# Patient Record
Sex: FEMALE | Race: WHITE | NOT HISPANIC OR LATINO | Employment: OTHER | URBAN - NONMETROPOLITAN AREA
[De-identification: names, ages, dates, MRNs, and addresses within clinical notes are randomized per-mention and may not be internally consistent; named-entity substitution may affect disease eponyms.]

---

## 2017-05-30 ENCOUNTER — OFFICE VISIT (OUTPATIENT)
Dept: OBSTETRICS AND GYNECOLOGY | Facility: CLINIC | Age: 67
End: 2017-05-30

## 2017-05-30 VITALS
BODY MASS INDEX: 33.77 KG/M2 | WEIGHT: 172 LBS | DIASTOLIC BLOOD PRESSURE: 76 MMHG | HEIGHT: 60 IN | SYSTOLIC BLOOD PRESSURE: 120 MMHG

## 2017-05-30 DIAGNOSIS — N81.6 RECTOCELE: ICD-10-CM

## 2017-05-30 DIAGNOSIS — N90.89 VULVAR LESION: ICD-10-CM

## 2017-05-30 DIAGNOSIS — N81.4 UTERINE PROLAPS: ICD-10-CM

## 2017-05-30 DIAGNOSIS — Z01.419 WELL WOMAN EXAM WITH ROUTINE GYNECOLOGICAL EXAM: Primary | ICD-10-CM

## 2017-05-30 DIAGNOSIS — Z12.31 ENCOUNTER FOR SCREENING MAMMOGRAM FOR MALIGNANT NEOPLASM OF BREAST: ICD-10-CM

## 2017-05-30 PROCEDURE — G0101 CA SCREEN;PELVIC/BREAST EXAM: HCPCS | Performed by: NURSE PRACTITIONER

## 2017-05-30 RX ORDER — ATORVASTATIN CALCIUM 20 MG/1
TABLET, FILM COATED ORAL
COMMUNITY

## 2017-05-30 RX ORDER — PHENOL 1.4 %
AEROSOL, SPRAY (ML) MUCOUS MEMBRANE
COMMUNITY
End: 2018-06-14

## 2017-05-30 RX ORDER — LOSARTAN POTASSIUM AND HYDROCHLOROTHIAZIDE 12.5; 5 MG/1; MG/1
TABLET ORAL
COMMUNITY

## 2017-05-30 RX ORDER — OMEPRAZOLE 20 MG/1
CAPSULE, DELAYED RELEASE ORAL
COMMUNITY

## 2017-05-30 RX ORDER — ACETAMINOPHEN 500 MG
TABLET ORAL EVERY 6 HOURS
COMMUNITY

## 2017-05-30 RX ORDER — VALGANCICLOVIR 450 MG/1
TABLET, FILM COATED ORAL
COMMUNITY
End: 2018-06-14 | Stop reason: DRUGHIGH

## 2017-06-14 ENCOUNTER — HOSPITAL ENCOUNTER (OUTPATIENT)
Dept: MAMMOGRAPHY | Facility: HOSPITAL | Age: 67
Discharge: HOME | End: 2017-06-14
Admitting: NURSE PRACTITIONER

## 2017-06-14 DIAGNOSIS — Z12.31 ENCOUNTER FOR SCREENING MAMMOGRAM FOR MALIGNANT NEOPLASM OF BREAST: ICD-10-CM

## 2017-06-14 PROCEDURE — 77063 BREAST TOMOSYNTHESIS BI: CPT

## 2017-06-14 PROCEDURE — G0202 SCR MAMMO BI INCL CAD: HCPCS

## 2018-06-14 ENCOUNTER — OFFICE VISIT (OUTPATIENT)
Dept: OBSTETRICS AND GYNECOLOGY | Facility: CLINIC | Age: 68
End: 2018-06-14

## 2018-06-14 VITALS
DIASTOLIC BLOOD PRESSURE: 70 MMHG | WEIGHT: 171 LBS | SYSTOLIC BLOOD PRESSURE: 120 MMHG | BODY MASS INDEX: 33.57 KG/M2 | HEIGHT: 60 IN

## 2018-06-14 DIAGNOSIS — N95.2 VAGINAL ATROPHY: ICD-10-CM

## 2018-06-14 DIAGNOSIS — Z12.31 ENCOUNTER FOR SCREENING MAMMOGRAM FOR MALIGNANT NEOPLASM OF BREAST: Primary | ICD-10-CM

## 2018-06-14 PROCEDURE — G0123 SCREEN CERV/VAG THIN LAYER: HCPCS | Performed by: NURSE PRACTITIONER

## 2018-06-14 PROCEDURE — 99213 OFFICE O/P EST LOW 20 MIN: CPT | Performed by: NURSE PRACTITIONER

## 2018-06-14 RX ORDER — VALACYCLOVIR HYDROCHLORIDE 500 MG/1
500 TABLET, FILM COATED ORAL
Status: ON HOLD | COMMUNITY
End: 2018-12-03

## 2018-06-14 RX ORDER — PREGABALIN 75 MG/1
75 CAPSULE ORAL 2 TIMES DAILY
Status: ON HOLD | COMMUNITY
End: 2018-12-03

## 2018-06-14 NOTE — PROGRESS NOTES
"      Mally Corea is a 68 y.o.      Chief Complaint   Patient presents with   • Gynecologic Exam     I am here for a yearly exam with a pap.  No problems           HPI Mally is in today, she c/o vaginal dryness. She is not taking HRT. She is not taking HRT.  She occ. has some urinary urgency.  She is going to continue exercising.        The following portions of the patient's history were reviewed and updated as appropriate:vital signs, allergies, current medications, past family history, past medical history, past social history, past surgical history and problem list.      Current Outpatient Prescriptions:   •  acetaminophen (TYLENOL) 500 MG tablet, Take  by mouth Every 6 (Six) Hours., Disp: , Rfl:   •  atorvastatin (LIPITOR) 20 MG tablet, Take  by mouth., Disp: , Rfl:   •  losartan-hydrochlorothiazide (HYZAAR) 50-12.5 MG per tablet, Take  by mouth., Disp: , Rfl:   •  Multiple Vitamins-Minerals (PRESERVISION AREDS 2+MULTI VIT PO), Take  by mouth., Disp: , Rfl:   •  omeprazole (priLOSEC) 20 MG capsule, Take  by mouth., Disp: , Rfl:   •  pregabalin (LYRICA) 75 MG capsule, Take 75 mg by mouth 2 (Two) Times a Day., Disp: , Rfl:   •  valACYclovir (VALTREX) 500 MG tablet, Take 500 mg by mouth., Disp: , Rfl:     Review of Systems  Breast ROS: negative    Objective      /70 (BP Location: Left arm, Patient Position: Sitting, Cuff Size: Adult)   Ht 152.4 cm (60\")   Wt 77.6 kg (171 lb)   BMI 33.40 kg/m²       Physical Exam   Constitutional: She appears well-developed and well-nourished.   HENT:   Head: Normocephalic and atraumatic.   Eyes: Right eye exhibits no discharge. Left eye exhibits no discharge.   Pulmonary/Chest: Effort normal.   Abdominal: Soft. She exhibits no distension.   Genitourinary: There is no rash or tenderness on the right labia. There is no tenderness or lesion on the left labia. Right adnexum displays no mass, no tenderness and no fullness. Left adnexum displays no mass, no " tenderness and no fullness. No erythema, tenderness or bleeding in the vagina. No foreign body in the vagina. No signs of injury around the vagina. No vaginal discharge found.   Genitourinary Comments: Vaginal tissues are atrophic.   Skin: No erythema. No pallor.   Psychiatric: She has a normal mood and affect.   Nursing note and vitals reviewed.       Assessment/Plan     Diagnoses and all orders for this visit:    Encounter for screening mammogram for malignant neoplasm of breast  -     Mammo Screening Digital Tomosynthesis Bilateral With CAD; Future  -     Liquid-based Pap Smear, Screening    Vaginal atrophy    Use bisi nut oil    BMI elevated  The patient is asked to make an attempt to improve diet and exercise patterns to aid in medical management of this problem.              Merlyn Sanabria, APRN  6/14/2018

## 2018-06-18 LAB
GEN CATEG CVX/VAG CYTO-IMP: NORMAL
LAB AP CASE REPORT: NORMAL
LAB AP GYN ADDITIONAL INFORMATION: NORMAL
LAB AP GYN OTHER FINDINGS: NORMAL
PATH INTERP SPEC-IMP: NORMAL
STAT OF ADQ CVX/VAG CYTO-IMP: NORMAL

## 2018-06-19 ENCOUNTER — HOSPITAL ENCOUNTER (OUTPATIENT)
Dept: MAMMOGRAPHY | Facility: HOSPITAL | Age: 68
Discharge: HOME | End: 2018-06-19
Admitting: NURSE PRACTITIONER

## 2018-06-19 DIAGNOSIS — Z12.31 ENCOUNTER FOR SCREENING MAMMOGRAM FOR MALIGNANT NEOPLASM OF BREAST: ICD-10-CM

## 2018-06-19 PROCEDURE — 77063 BREAST TOMOSYNTHESIS BI: CPT

## 2018-06-19 PROCEDURE — 77067 SCR MAMMO BI INCL CAD: CPT

## 2018-10-16 ENCOUNTER — OFFICE VISIT (OUTPATIENT)
Dept: GASTROENTEROLOGY | Facility: CLINIC | Age: 68
End: 2018-10-16

## 2018-10-16 VITALS
BODY MASS INDEX: 34.95 KG/M2 | WEIGHT: 178 LBS | OXYGEN SATURATION: 98 % | DIASTOLIC BLOOD PRESSURE: 70 MMHG | HEART RATE: 73 BPM | TEMPERATURE: 96.8 F | SYSTOLIC BLOOD PRESSURE: 132 MMHG | HEIGHT: 60 IN

## 2018-10-16 DIAGNOSIS — Z86.010 HX OF COLONIC POLYPS: Primary | ICD-10-CM

## 2018-10-16 PROBLEM — Z86.0100 HX OF COLONIC POLYPS: Status: ACTIVE | Noted: 2018-10-16

## 2018-10-16 PROCEDURE — S0260 H&P FOR SURGERY: HCPCS | Performed by: NURSE PRACTITIONER

## 2018-10-16 RX ORDER — SODIUM, POTASSIUM,MAG SULFATES 17.5-3.13G
2 SOLUTION, RECONSTITUTED, ORAL ORAL ONCE
Qty: 2 BOTTLE | Refills: 0 | Status: SHIPPED | OUTPATIENT
Start: 2018-10-16 | End: 2018-10-16

## 2018-10-16 NOTE — PROGRESS NOTES
Chief Complaint   Patient presents with   • Colon Cancer Screening     Patient is here today for colon screening.     Subjective   HPI  Mally Corea is a 68 y.o. female who presents as a referral for preventative maintenance. She has no complaints of nausea or vomiting. No change in bowels. No wt loss. No BRBPR. No melena. The patient is adopted and does not know family history.  No abdominal pain. There was no Cologaurd screening this year. The patients last colonoscopy 11/14/13 with finding of tubular adenoma.   Past Medical History:   Diagnosis Date   • Basal cell carcinoma     Left calf, nose, left forearm, right side of chest   • Colon polyp    • GERD (gastroesophageal reflux disease)    • Hyperlipidemia    • Hypertension    • Schatzki's ring    • Skin cancer, basal cell      Past Surgical History:   Procedure Laterality Date   • ADENOIDECTOMY     • BASAL CELL CARCINOMA EXCISION     • BLEPHAROPLASTY     • CATARACT EXTRACTION Bilateral    • COLONOSCOPY  11/14/2013   • REDUCTION MAMMAPLASTY Bilateral 2015   • REPLACEMENT TOTAL KNEE Left    • RETINAL DETACHMENT REPAIR Bilateral    • TONSILLECTOMY     • TUBAL ABDOMINAL LIGATION     • UPPER GASTROINTESTINAL ENDOSCOPY  12/16/2009       Current Outpatient Prescriptions:   •  acetaminophen (TYLENOL) 500 MG tablet, Take  by mouth Every 6 (Six) Hours., Disp: , Rfl:   •  atorvastatin (LIPITOR) 20 MG tablet, Take  by mouth., Disp: , Rfl:   •  losartan-hydrochlorothiazide (HYZAAR) 50-12.5 MG per tablet, Take  by mouth., Disp: , Rfl:   •  Multiple Vitamins-Minerals (PRESERVISION AREDS 2+MULTI VIT PO), Take  by mouth., Disp: , Rfl:   •  omeprazole (priLOSEC) 20 MG capsule, Take  by mouth., Disp: , Rfl:   •  pregabalin (LYRICA) 75 MG capsule, Take 75 mg by mouth 2 (Two) Times a Day., Disp: , Rfl:   •  valACYclovir (VALTREX) 500 MG tablet, Take 500 mg by mouth., Disp: , Rfl:   •  sodium-potassium-magnesium sulfates (SUPREP BOWEL PREP KIT) 17.5-3.13-1.6 GM/177ML solution  "oral solution, Take 2 bottles by mouth 1 (One) Time for 1 dose. Split dose prep as directed by office instructions provided.  2 bottles = one kit., Disp: 2 bottle, Rfl: 0  Allergies   Allergen Reactions   • Nsaids Itching and Swelling   • Sulfa Antibiotics Nausea And Vomiting   • Ace Inhibitors Cough     Social History     Social History   • Marital status:      Spouse name: N/A   • Number of children: N/A   • Years of education: N/A     Occupational History   • Not on file.     Social History Main Topics   • Smoking status: Never Smoker   • Smokeless tobacco: Never Used   • Alcohol use Yes      Comment: socially   • Drug use: No   • Sexual activity: No     Other Topics Concern   • Not on file     Social History Narrative   • No narrative on file     Family History   Problem Relation Age of Onset   • Adopted: Yes       REVIEW OF SYSTEMS  General: well appearing, no fever chills or sweats, no unexplained wt loss  HEENT: no acute visual or hearing disturbances  Cardiovascular: No chest pain or palpitations  Pulmonary: No shortness of breath, coughing, wheezing or hemoptysis  : No burning, urgency, hematuria, or dysuria  Musculoskeletal: No joint pain or stiffness  Peripheral: no edema  Skin: No lesions or rashes  Neuro: No dizziness, headaches, stroke, syncope  Endocrine: No hot or cold intolerances  Hematological: No blood dyscrasias    Objective   Vitals:    10/16/18 1326   BP: 132/70   Pulse: 73   Temp: 96.8 °F (36 °C)   SpO2: 98%   Weight: 80.7 kg (178 lb)   Height: 152.4 cm (60\")     Body mass index is 34.76 kg/m².    PHYSICAL EXAM  General: age appropriate well nourished well appearing, no acute distress  Head: normocephalic and atraumatic  Global assessment-supple  Neck-No JVD noted, no lymphadenopathy  Pulmonary-clear to auscultation bilaterally, normal respiratory effort  Cardiovascular-normal rate and rhythm, normal heart sounds, S1 and S2 noted  Abdomen-soft, non tender, non distended, normal bowel " sounds all 4 quadrants, no hepatosplenomegaly noted  Extremities-No clubbing cyanosis or edema  Neuro-Non focal, converses appropriately, awake, alert, oriented    Imaging Results (most recent)     None        Assessment/Plan   Mally was seen today for colon cancer screening.    Diagnoses and all orders for this visit:    Hx of colonic polyps  -     Case Request; Standing  -     Case Request    Other orders  -     Follow Anesthesia Guidelines / Standing Orders; Future  -     Implement Anesthesia Orders Day of Procedure; Standing  -     Obtain Informed Consent; Standing  -     Verify bowel prep was successful; Standing  -     sodium-potassium-magnesium sulfates (SUPREP BOWEL PREP KIT) 17.5-3.13-1.6 GM/177ML solution oral solution; Take 2 bottles by mouth 1 (One) Time for 1 dose. Split dose prep as directed by office instructions provided.  2 bottles = one kit.      COLONOSCOPY WITH ANESTHESIA (N/A)       Body mass index is 34.76 kg/m². Patient's Body mass index is 34.76 kg/m². BMI is above normal parameters. Recommendations include: no follow-up required.      All risks, benefits, alternatives, and indications of colonoscopy procedure have been discussed with the patient. Risks to include perforation of the colon requiring possible surgery or colostomy, risk of bleeding from biopsies or removal of colon tissue, possibility of missing a colon polyp or cancer, or adverse drug reaction.  Benefits to include the diagnosis and management of disease of the colon and rectum. Alternatives to include barium enema, radiographic evaluation, lab testing or no intervention. Pt verbalizes understanding and agrees.

## 2018-12-03 ENCOUNTER — HOSPITAL ENCOUNTER (OUTPATIENT)
Facility: HOSPITAL | Age: 68
Setting detail: HOSPITAL OUTPATIENT SURGERY
Discharge: HOME | End: 2018-12-03
Attending: INTERNAL MEDICINE | Admitting: INTERNAL MEDICINE

## 2018-12-03 ENCOUNTER — ANESTHESIA EVENT (OUTPATIENT)
Dept: GASTROENTEROLOGY | Facility: HOSPITAL | Age: 68
End: 2018-12-03

## 2018-12-03 ENCOUNTER — ANESTHESIA (OUTPATIENT)
Dept: GASTROENTEROLOGY | Facility: HOSPITAL | Age: 68
End: 2018-12-03

## 2018-12-03 VITALS
SYSTOLIC BLOOD PRESSURE: 121 MMHG | TEMPERATURE: 97.3 F | WEIGHT: 175 LBS | OXYGEN SATURATION: 96 % | HEIGHT: 60 IN | HEART RATE: 75 BPM | RESPIRATION RATE: 16 BRPM | DIASTOLIC BLOOD PRESSURE: 83 MMHG | BODY MASS INDEX: 34.36 KG/M2

## 2018-12-03 DIAGNOSIS — Z86.010 HX OF COLONIC POLYPS: ICD-10-CM

## 2018-12-03 PROCEDURE — 45385 COLONOSCOPY W/LESION REMOVAL: CPT | Performed by: INTERNAL MEDICINE

## 2018-12-03 PROCEDURE — 25010000002 PROPOFOL 10 MG/ML EMULSION: Performed by: NURSE ANESTHETIST, CERTIFIED REGISTERED

## 2018-12-03 PROCEDURE — 88305 TISSUE EXAM BY PATHOLOGIST: CPT | Performed by: INTERNAL MEDICINE

## 2018-12-03 RX ORDER — PROPOFOL 10 MG/ML
VIAL (ML) INTRAVENOUS AS NEEDED
Status: DISCONTINUED | OUTPATIENT
Start: 2018-12-03 | End: 2018-12-03 | Stop reason: SURG

## 2018-12-03 RX ORDER — DOCUSATE SODIUM 100 MG/1
100 CAPSULE, LIQUID FILLED ORAL 2 TIMES DAILY
COMMUNITY

## 2018-12-03 RX ORDER — MULTIVIT-MIN/IRON/FOLIC ACID/K 18-600-40
CAPSULE ORAL DAILY
COMMUNITY

## 2018-12-03 RX ORDER — LIDOCAINE HYDROCHLORIDE 20 MG/ML
INJECTION, SOLUTION INFILTRATION; PERINEURAL AS NEEDED
Status: DISCONTINUED | OUTPATIENT
Start: 2018-12-03 | End: 2018-12-03 | Stop reason: SURG

## 2018-12-03 RX ORDER — SODIUM CHLORIDE 0.9 % (FLUSH) 0.9 %
3-10 SYRINGE (ML) INJECTION AS NEEDED
Status: CANCELLED | OUTPATIENT
Start: 2018-12-03

## 2018-12-03 RX ORDER — SODIUM CHLORIDE 0.9 % (FLUSH) 0.9 %
3 SYRINGE (ML) INJECTION EVERY 12 HOURS SCHEDULED
Status: CANCELLED | OUTPATIENT
Start: 2018-12-03

## 2018-12-03 RX ORDER — SODIUM CHLORIDE 9 MG/ML
100 INJECTION, SOLUTION INTRAVENOUS CONTINUOUS
Status: CANCELLED | OUTPATIENT
Start: 2018-12-03

## 2018-12-03 RX ORDER — SODIUM CHLORIDE 9 MG/ML
500 INJECTION, SOLUTION INTRAVENOUS CONTINUOUS PRN
Status: DISCONTINUED | OUTPATIENT
Start: 2018-12-03 | End: 2018-12-03 | Stop reason: HOSPADM

## 2018-12-03 RX ORDER — SODIUM CHLORIDE 0.9 % (FLUSH) 0.9 %
3 SYRINGE (ML) INJECTION AS NEEDED
Status: DISCONTINUED | OUTPATIENT
Start: 2018-12-03 | End: 2018-12-03 | Stop reason: HOSPADM

## 2018-12-03 RX ADMIN — PROPOFOL 70 MG: 10 INJECTION, EMULSION INTRAVENOUS at 12:23

## 2018-12-03 RX ADMIN — PROPOFOL 50 MG: 10 INJECTION, EMULSION INTRAVENOUS at 12:26

## 2018-12-03 RX ADMIN — PROPOFOL 50 MG: 10 INJECTION, EMULSION INTRAVENOUS at 12:30

## 2018-12-03 RX ADMIN — SODIUM CHLORIDE 500 ML: 9 INJECTION, SOLUTION INTRAVENOUS at 10:29

## 2018-12-03 RX ADMIN — LIDOCAINE HYDROCHLORIDE 50 MG: 20 INJECTION, SOLUTION INFILTRATION; PERINEURAL at 12:23

## 2018-12-03 RX ADMIN — PROPOFOL 30 MG: 10 INJECTION, EMULSION INTRAVENOUS at 12:34

## 2018-12-03 RX ADMIN — LIDOCAINE HYDROCHLORIDE 0.5 ML: 10 INJECTION, SOLUTION EPIDURAL; INFILTRATION; INTRACAUDAL; PERINEURAL at 10:29

## 2018-12-03 NOTE — ANESTHESIA PREPROCEDURE EVALUATION
Anesthesia Evaluation     no history of anesthetic complications:  NPO Solid Status: > 8 hours  NPO Liquid Status: > 2 hours           Airway   Mallampati: III  TM distance: <3 FB  Neck ROM: full  Dental          Pulmonary - normal exam    breath sounds clear to auscultation  (-) asthma, recent URI, sleep apnea, not a smoker  Cardiovascular - normal exam  Exercise tolerance: good (4-7 METS)    Rhythm: regular  Rate: normal    (+) hypertension well controlled, hyperlipidemia,   (-) pacemaker, past MI, angina, cardiac stents      Neuro/Psych  (-) seizures, TIA, CVA  GI/Hepatic/Renal/Endo    (+) obesity,  GERD,    (-) liver disease, no renal disease, diabetes, hypothyroidism, hyperthyroidism    Musculoskeletal     Abdominal    Substance History      OB/GYN          Other                      Anesthesia Plan    ASA 2     general   total IV anesthesia  intravenous induction   Anesthetic plan, all risks, benefits, and alternatives have been provided, discussed and informed consent has been obtained with: patient.

## 2018-12-03 NOTE — ANESTHESIA POSTPROCEDURE EVALUATION
Patient: Mally Corea    Procedure Summary     Date:  12/03/18 Room / Location:  [facility] ENDOSCOPY 4 / [facility]    Anesthesia Start:  1216 Anesthesia Stop:  1242    Procedure:  COLONOSCOPY WITH ANESTHESIA (N/A ) Diagnosis:       Hx of colonic polyps      (Hx of colonic polyps [Z86.010])    Surgeon:  Shelly Palma MD Provider:  Matthew Espinoza CRNA    Anesthesia Type:  general ASA Status:  2          Anesthesia Type: general  Last vitals  BP   106/53 (12/03/18 1240)   Temp   97.3 °F (36.3 °C) (12/03/18 1004)   Pulse   110 (12/03/18 1240)   Resp   16 (12/03/18 1240)     SpO2   93 % (12/03/18 1240)     Post Anesthesia Care and Evaluation    Patient location during evaluation: PHASE II  Patient participation: complete - patient participated  Level of consciousness: awake  Pain score: 2  Pain management: adequate  Airway patency: patent  Anesthetic complications: No anesthetic complications  PONV Status: noneRespiratory status: acceptable  Hydration status: acceptable

## 2018-12-03 NOTE — H&P
Chief Complaint:   Colon polyps    Subjective     HPI:   She had tubular adenoma removed most recently in November 2013.  She is here for follow-up.    Past Medical History:   Past Medical History:   Diagnosis Date   • Basal cell carcinoma     Left calf, nose, left forearm, right side of chest   • Colon polyp    • GERD (gastroesophageal reflux disease)    • Hyperlipidemia    • Hypertension    • Schatzki's ring    • Skin cancer, basal cell    • Spinal headache        Past Surgical History:  Past Surgical History:   Procedure Laterality Date   • ADENOIDECTOMY     • BASAL CELL CARCINOMA EXCISION     • BLEPHAROPLASTY     • CATARACT EXTRACTION Bilateral    • COLONOSCOPY  11/14/2013   • REDUCTION MAMMAPLASTY Bilateral 2015   • REPLACEMENT TOTAL KNEE Left    • RETINAL DETACHMENT REPAIR Bilateral    • TONSILLECTOMY     • TUBAL ABDOMINAL LIGATION     • UPPER GASTROINTESTINAL ENDOSCOPY  12/16/2009        Family History:  Family History   Adopted: Yes       Social History:   reports that  has never smoked. she has never used smokeless tobacco. She reports that she drinks alcohol. She reports that she does not use drugs.    Medications:   Medications Prior to Admission   Medication Sig Dispense Refill Last Dose   • acetaminophen (TYLENOL) 500 MG tablet Take  by mouth Every 6 (Six) Hours.   12/2/2018 at Unknown time   • atorvastatin (LIPITOR) 20 MG tablet Take  by mouth.   12/2/2018 at Unknown time   • Cholecalciferol (VITAMIN D) 2000 units capsule Take  by mouth Daily.   12/1/2018   • docusate sodium (COLACE) 100 MG capsule Take 100 mg by mouth 2 (Two) Times a Day.   12/1/2018   • losartan-hydrochlorothiazide (HYZAAR) 50-12.5 MG per tablet Take  by mouth.   12/2/2018 at Unknown time   • Psyllium (METAMUCIL FIBER PO) Take 1 teaspoon(s) by mouth Daily.   11/26/2018   • Multiple Vitamins-Minerals (PRESERVISION AREDS 2+MULTI VIT PO) Take  by mouth.   11/26/2018   • omeprazole (priLOSEC) 20 MG capsule Take  by mouth.   12/1/2018  "      Allergies:  Nsaids; Sulfa antibiotics; and Ace inhibitors    ROS:    General: Weight stable  Resp: No SOA  Cardiovascular: No CP      Objective     /81 (BP Location: Right arm, Patient Position: Sitting)   Pulse 92   Temp 97.3 °F (36.3 °C) (Temporal)   Resp 20   Ht 152.4 cm (60\")   Wt 79.4 kg (175 lb)   SpO2 100%   Breastfeeding? No   BMI 34.18 kg/m²     Physical Exam   Constitutional: Pt is oriented to person, place, and in no distress.  Eyes: No icterus  ENMT: Unremarkable   Cardiovascular: Normal rate, regular rhythm.    Pulmonary/Chest: No distress.  No audible wheezes  Abdominal: Soft.   Skin: Skin is warm and dry.   Psychiatric: Mood, memory, affect and judgment appear normal.     Assessment/Plan     Diagnosis:  Colon polyps    Anticipated Surgical Procedure:  Colonoscopy    The risks, benefits, and alternatives of colonoscopy were reviewed with the patient today.  Risks including perforation of the colon possibly requiring surgery or colostomy.  Additional risks include risk of bleeding from biopsies or removal of colon tissue.  There is also the risk of a drug reaction or problems with anesthesia.  This will be discussed with the further by the anesthesia team on the day of the procedure.  Lastly there is a possibility of missing a colon polyp or cancer.  The benefits include the diagnosis and management of disease of the colon and rectum.  Alternatives to colonoscopy include barium enema, laboratory testing, radiographic evaluation, or no intervention.  The patient verbalizes understanding and agrees.    Much of this encounter note is an electronic transcription/translation of spoken language to printed text. The electronic translation of spoken language may permit erroneous, or at times, nonsensical words or phrases to be inadvertently transcribed; although I have reviewed the note for such errors, some may still exist.        "

## 2018-12-04 LAB
CYTO UR: NORMAL
LAB AP CASE REPORT: NORMAL
PATH REPORT.FINAL DX SPEC: NORMAL
PATH REPORT.GROSS SPEC: NORMAL

## 2018-12-10 ENCOUNTER — TELEPHONE (OUTPATIENT)
Dept: GASTROENTEROLOGY | Facility: CLINIC | Age: 68
End: 2018-12-10

## 2018-12-10 NOTE — TELEPHONE ENCOUNTER
----- Message from Shelly Palma MD sent at 12/7/2018  6:56 PM CST -----  I am writing to inform you that the polyp removed from the colon did not have any cancer. It no longer poses a threat to you since it was completely removed.  However, in the future, it is possible that additional polyps might grow.  For this reason, we will repeat colonoscopy in 5 years as planned.    If you have any questions, please call our office.    Sincerely,        Shelly Palma MD

## 2019-05-17 DIAGNOSIS — Z12.39 SCREENING FOR BREAST CANCER: Primary | ICD-10-CM

## 2019-06-20 ENCOUNTER — HOSPITAL ENCOUNTER (OUTPATIENT)
Dept: MAMMOGRAPHY | Facility: HOSPITAL | Age: 69
Discharge: HOME | End: 2019-06-20
Admitting: NURSE PRACTITIONER

## 2019-06-20 PROCEDURE — 77063 BREAST TOMOSYNTHESIS BI: CPT

## 2019-06-20 PROCEDURE — 77067 SCR MAMMO BI INCL CAD: CPT

## 2020-05-07 ENCOUNTER — TRANSCRIBE ORDERS (OUTPATIENT)
Dept: ADMINISTRATIVE | Facility: HOSPITAL | Age: 70
End: 2020-05-07

## 2020-05-07 DIAGNOSIS — Z12.31 ENCOUNTER FOR SCREENING MAMMOGRAM FOR MALIGNANT NEOPLASM OF BREAST: Primary | ICD-10-CM

## 2020-06-25 ENCOUNTER — APPOINTMENT (OUTPATIENT)
Dept: MAMMOGRAPHY | Facility: HOSPITAL | Age: 70
End: 2020-06-25

## 2020-07-08 ENCOUNTER — HOSPITAL ENCOUNTER (OUTPATIENT)
Dept: MAMMOGRAPHY | Facility: HOSPITAL | Age: 70
Discharge: HOME | End: 2020-07-08
Admitting: NURSE PRACTITIONER

## 2020-07-08 ENCOUNTER — OFFICE VISIT (OUTPATIENT)
Dept: OBSTETRICS AND GYNECOLOGY | Facility: CLINIC | Age: 70
End: 2020-07-08

## 2020-07-08 VITALS
DIASTOLIC BLOOD PRESSURE: 70 MMHG | WEIGHT: 177 LBS | HEIGHT: 60 IN | BODY MASS INDEX: 34.75 KG/M2 | SYSTOLIC BLOOD PRESSURE: 130 MMHG

## 2020-07-08 DIAGNOSIS — Z12.31 ENCOUNTER FOR SCREENING MAMMOGRAM FOR MALIGNANT NEOPLASM OF BREAST: ICD-10-CM

## 2020-07-08 DIAGNOSIS — Z01.419 WELL WOMAN EXAM WITH ROUTINE GYNECOLOGICAL EXAM: Primary | ICD-10-CM

## 2020-07-08 PROCEDURE — 77063 BREAST TOMOSYNTHESIS BI: CPT

## 2020-07-08 PROCEDURE — G0101 CA SCREEN;PELVIC/BREAST EXAM: HCPCS | Performed by: NURSE PRACTITIONER

## 2020-07-08 PROCEDURE — G0123 SCREEN CERV/VAG THIN LAYER: HCPCS | Performed by: NURSE PRACTITIONER

## 2020-07-08 PROCEDURE — 77067 SCR MAMMO BI INCL CAD: CPT

## 2020-07-08 RX ORDER — VALACYCLOVIR HYDROCHLORIDE 500 MG/1
TABLET, FILM COATED ORAL
COMMUNITY
Start: 2020-07-03

## 2020-07-08 RX ORDER — PREGABALIN 75 MG/1
CAPSULE ORAL
COMMUNITY
Start: 2020-04-16 | End: 2022-08-08

## 2020-07-08 NOTE — PROGRESS NOTES
Mally Corea is a 70 y.o.      Chief Complaint   Patient presents with   • Gynecologic Exam     pt here for annual exam. Last pap 2018, normal. Last mamm 20, negative. Last bone density 2016, normal.           HPI - Patient is in for annual exam.  She had cryo therapy over 30 years ago. Patient has no vaginal bleeding.  She is taking vit. D3  2000 iu daily. She has  No vaginal bleeding and does not take HRT.  She is current with her mammogram.  Patient is adopted and does no know her family history.      The following portions of the patient's history were reviewed and updated as appropriate:vital signs, allergies, current medications, past family history, past medical history, past social history, past surgical history and problem list.      Current Outpatient Medications:   •  acetaminophen (TYLENOL) 500 MG tablet, Take  by mouth Every 6 (Six) Hours., Disp: , Rfl:   •  atorvastatin (LIPITOR) 20 MG tablet, Take  by mouth., Disp: , Rfl:   •  Cholecalciferol (VITAMIN D) 2000 units capsule, Take  by mouth Daily., Disp: , Rfl:   •  docusate sodium (COLACE) 100 MG capsule, Take 100 mg by mouth 2 (Two) Times a Day., Disp: , Rfl:   •  losartan-hydrochlorothiazide (HYZAAR) 50-12.5 MG per tablet, Take  by mouth., Disp: , Rfl:   •  Multiple Vitamins-Minerals (PRESERVISION AREDS 2+MULTI VIT PO), Take  by mouth., Disp: , Rfl:   •  omeprazole (priLOSEC) 20 MG capsule, Take  by mouth., Disp: , Rfl:   •  pregabalin (LYRICA) 75 MG capsule, , Disp: , Rfl:   •  Psyllium (METAMUCIL FIBER PO), Take 1 teaspoon(s) by mouth Daily., Disp: , Rfl:   •  valACYclovir (VALTREX) 500 MG tablet, , Disp: , Rfl:     Review of Systems   Constitutional: Negative for diaphoresis.   HENT: Negative for dental problem, ear discharge, hearing loss and rhinorrhea.    Eyes: Negative for pain and discharge.        History of cataract surgery    History of retinal detachment     Respiratory: Negative for apnea and choking.   "  Gastrointestinal: Positive for GERD.        History diagnosis Schatzki's ring   Genitourinary: Negative for breast lump, genital sores, urgency and vaginal bleeding.   Musculoskeletal: Positive for arthralgias and joint swelling.   Skin:        History of removal basal cell carcinoma skin   Neurological: Negative for dizziness, speech difficulty and headache.        Uses Lyrica   Psychiatric/Behavioral: Negative for agitation, behavioral problems, sleep disturbance and suicidal ideas.     Breast ROS   history of breast reduction and mammogram is current    Objective      /70   Ht 152.4 cm (60\")   Wt 80.3 kg (177 lb)   Breastfeeding No   BMI 34.57 kg/m²       Physical Exam   Constitutional: She is oriented to person, place, and time. She appears well-developed and well-nourished. No distress.   HENT:   Head: Normocephalic and atraumatic.   Eyes: Right eye exhibits no discharge. Left eye exhibits no discharge.   Neck: Normal range of motion. Neck supple.   Cardiovascular: Normal rate and regular rhythm.   No murmur heard.  Pulmonary/Chest: Effort normal and breath sounds normal. Right breast exhibits no inverted nipple and no mass. Left breast exhibits no inverted nipple and no mass. No breast tenderness or discharge.   Breast reduction  No isolated palpable masses   Abdominal: Soft. She exhibits no distension. There is no tenderness.   Genitourinary: Rectal exam shows no mass. Pelvic exam was performed with patient supine. There is no tenderness or lesion on the right labia. There is no tenderness or lesion on the left labia. Uterus is mobile. Cervix does not exhibit motion tenderness, discharge or friability. Right adnexum displays no tenderness and no fullness. Left adnexum displays no tenderness and no fullness. Vagina exhibits loss of rugae. No tenderness or bleeding in the vagina. No vaginal discharge found.   Genitourinary Comments: Anterior wall relaxed  Vaginal tissues atrophic  Bladder " nontender  Urethral meatus atrophic without lesions   Musculoskeletal: Normal range of motion. She exhibits no edema.   Neurological: She is alert and oriented to person, place, and time.   Skin: Skin is warm and dry.   Psychiatric: She has a normal mood and affect. Her behavior is normal. Judgment normal.   Nursing note and vitals reviewed.       Assessment/Plan     Mally was seen today for gynecologic exam.    Diagnoses and all orders for this visit:    Well woman exam with routine gynecological exam  Comments:  Postmenopausal without bleeding or HRT  Cryo many years ago  Orders:  -     Liquid-based Pap Smear, Screening    BMI 34.0-34.9,adult  Comments:  Counseled re: low carb. diet as tolerated.            Merlyn Sanabria, APRN  7/8/2020  .Patient is counseled re: BSE, diet, exercise, mammogram, calcium and Vit. D3

## 2021-05-28 ENCOUNTER — TRANSCRIBE ORDERS (OUTPATIENT)
Dept: ADMINISTRATIVE | Facility: HOSPITAL | Age: 71
End: 2021-05-28

## 2021-05-28 DIAGNOSIS — Z12.31 ENCOUNTER FOR SCREENING MAMMOGRAM FOR MALIGNANT NEOPLASM OF BREAST: Primary | ICD-10-CM

## 2021-07-09 ENCOUNTER — APPOINTMENT (OUTPATIENT)
Dept: MAMMOGRAPHY | Facility: HOSPITAL | Age: 71
End: 2021-07-09

## 2021-07-16 ENCOUNTER — HOSPITAL ENCOUNTER (OUTPATIENT)
Dept: MAMMOGRAPHY | Facility: HOSPITAL | Age: 71
Discharge: HOME | End: 2021-07-16
Admitting: NURSE PRACTITIONER

## 2021-07-16 DIAGNOSIS — Z12.31 ENCOUNTER FOR SCREENING MAMMOGRAM FOR MALIGNANT NEOPLASM OF BREAST: ICD-10-CM

## 2021-07-16 PROCEDURE — 77067 SCR MAMMO BI INCL CAD: CPT

## 2021-07-16 PROCEDURE — 77063 BREAST TOMOSYNTHESIS BI: CPT

## 2022-06-07 ENCOUNTER — TRANSCRIBE ORDERS (OUTPATIENT)
Dept: ADMINISTRATIVE | Facility: HOSPITAL | Age: 72
End: 2022-06-07

## 2022-06-07 DIAGNOSIS — Z12.31 BREAST CANCER SCREENING BY MAMMOGRAM: Primary | ICD-10-CM

## 2022-07-19 ENCOUNTER — HOSPITAL ENCOUNTER (OUTPATIENT)
Dept: MAMMOGRAPHY | Facility: HOSPITAL | Age: 72
Discharge: HOME | End: 2022-07-19
Admitting: NURSE PRACTITIONER

## 2022-07-19 DIAGNOSIS — R92.1 BREAST CALCIFICATIONS ON MAMMOGRAM: Primary | ICD-10-CM

## 2022-07-19 PROCEDURE — 77067 SCR MAMMO BI INCL CAD: CPT

## 2022-07-19 PROCEDURE — 77063 BREAST TOMOSYNTHESIS BI: CPT

## 2022-07-19 NOTE — PROGRESS NOTES
Screening mammogram showed calcifications in the left breast that require spot views and orders placed.  Rivka Albarran MA will advise the patient.  JULIAN Simmons

## 2022-07-21 ENCOUNTER — HOSPITAL ENCOUNTER (OUTPATIENT)
Dept: MAMMOGRAPHY | Facility: HOSPITAL | Age: 72
Discharge: HOME | End: 2022-07-21
Admitting: NURSE PRACTITIONER

## 2022-07-21 DIAGNOSIS — R92.1 BREAST CALCIFICATIONS ON MAMMOGRAM: ICD-10-CM

## 2022-07-21 PROCEDURE — G0279 TOMOSYNTHESIS, MAMMO: HCPCS

## 2022-07-21 PROCEDURE — 77065 DX MAMMO INCL CAD UNI: CPT

## 2022-08-08 ENCOUNTER — OFFICE VISIT (OUTPATIENT)
Dept: OBSTETRICS AND GYNECOLOGY | Facility: CLINIC | Age: 72
End: 2022-08-08

## 2022-08-08 VITALS
WEIGHT: 168 LBS | SYSTOLIC BLOOD PRESSURE: 128 MMHG | HEIGHT: 60 IN | BODY MASS INDEX: 32.98 KG/M2 | DIASTOLIC BLOOD PRESSURE: 80 MMHG

## 2022-08-08 DIAGNOSIS — E66.09 CLASS 1 OBESITY DUE TO EXCESS CALORIES WITHOUT SERIOUS COMORBIDITY WITH BODY MASS INDEX (BMI) OF 32.0 TO 32.9 IN ADULT: ICD-10-CM

## 2022-08-08 DIAGNOSIS — Z78.0 POST-MENOPAUSAL: ICD-10-CM

## 2022-08-08 DIAGNOSIS — Z01.419 WELL WOMAN EXAM WITH ROUTINE GYNECOLOGICAL EXAM: Primary | ICD-10-CM

## 2022-08-08 PROCEDURE — G0101 CA SCREEN;PELVIC/BREAST EXAM: HCPCS | Performed by: NURSE PRACTITIONER

## 2022-08-08 PROCEDURE — G0123 SCREEN CERV/VAG THIN LAYER: HCPCS | Performed by: NURSE PRACTITIONER

## 2022-08-08 PROCEDURE — 87624 HPV HI-RISK TYP POOLED RSLT: CPT | Performed by: NURSE PRACTITIONER

## 2022-08-08 NOTE — PROGRESS NOTES
Mally De La Cruz is a 72 y.o.      Chief Complaint   Patient presents with   • Gynecologic Exam     Pt here for annual Gyn exam. Last pap 2020, normal. Last mamm 22.            HPI - Patient is in for gyn exam.  She has no vaginal bleeding.  Her mammogram is current with the screening requiring a diagnostic view of left breast.  The spot view was normal.  She has not had a DEXA in several years.        The following portions of the patient's history were reviewed and updated as appropriate:vital signs, allergies, current medications, past family history, past medical history, past social history, past surgical history and problem list.      Current Outpatient Medications:   •  acetaminophen (TYLENOL) 500 MG tablet, Take  by mouth Every 6 (Six) Hours., Disp: , Rfl:   •  atorvastatin (LIPITOR) 20 MG tablet, Take  by mouth., Disp: , Rfl:   •  Cholecalciferol (VITAMIN D) 2000 units capsule, Take  by mouth Daily., Disp: , Rfl:   •  docusate sodium (COLACE) 100 MG capsule, Take 100 mg by mouth 2 (Two) Times a Day., Disp: , Rfl:   •  losartan-hydrochlorothiazide (HYZAAR) 50-12.5 MG per tablet, Take  by mouth., Disp: , Rfl:   •  Multiple Vitamins-Minerals (PRESERVISION AREDS 2+MULTI VIT PO), Take  by mouth., Disp: , Rfl:   •  omeprazole (priLOSEC) 20 MG capsule, Take  by mouth., Disp: , Rfl:   •  Psyllium (METAMUCIL FIBER PO), Take 1 teaspoon(s) by mouth Daily., Disp: , Rfl:   •  valACYclovir (VALTREX) 500 MG tablet, , Disp: , Rfl:   •  pregabalin (LYRICA) 75 MG capsule, , Disp: , Rfl:     Review of Systems   Constitutional: Negative for chills, fever and unexpected weight loss.   HENT: Negative for congestion, ear pain, sneezing, sore throat and tinnitus.    Eyes: Negative for blurred vision, redness, itching and visual disturbance.   Respiratory: Negative for apnea, choking and shortness of breath.    Cardiovascular: Negative for chest pain and palpitations.        Elevated  "cholesterol  hypertension   Gastrointestinal: Negative for abdominal distention, nausea and vomiting.        GERD    HX colon polyps   Endocrine: Negative for cold intolerance, polydipsia and polyuria.   Genitourinary: Negative for breast pain, decreased urine volume, flank pain, pelvic pain, vaginal bleeding and vaginal pain.        Cryo     Musculoskeletal: Negative for gait problem and neck pain.   Skin: Negative for color change and pallor.   Neurological: Negative for dizziness, tremors, seizures, speech difficulty, light-headedness and memory problem.   Psychiatric/Behavioral: Negative for behavioral problems, dysphoric mood, self-injury and suicidal ideas.         Objective     /80   Ht 152.4 cm (60\")   Wt 76.2 kg (168 lb)   BMI 32.81 kg/m²       Physical Exam  Vitals and nursing note reviewed. Exam conducted with a chaperone present.   Constitutional:       Appearance: Normal appearance. She is well-developed. She is not ill-appearing.   HENT:      Head: Normocephalic and atraumatic.      Right Ear: External ear normal.      Left Ear: External ear normal.   Eyes:      General: No scleral icterus.        Right eye: No discharge.         Left eye: No discharge.      Conjunctiva/sclera: Conjunctivae normal.   Neck:      Thyroid: No thyromegaly.   Cardiovascular:      Rate and Rhythm: Regular rhythm.      Heart sounds: Normal heart sounds. No murmur heard.  Pulmonary:      Effort: Pulmonary effort is normal. No respiratory distress.      Breath sounds: Normal breath sounds.   Chest:   Breasts: Breasts are symmetrical.      Right: No inverted nipple, mass, nipple discharge, skin change or tenderness.      Left: No inverted nipple, mass, nipple discharge, skin change or tenderness.        Comments: Post breast reduction    Abdominal:      General: Bowel sounds are normal. There is no distension.      Palpations: Abdomen is soft. There is no mass.      Tenderness: There is no abdominal tenderness. There is " no guarding.      Hernia: No hernia is present. There is no hernia in the left inguinal area or right inguinal area.   Genitourinary:     Exam position: Lithotomy position.      Labia:         Right: Lesion (3 mm inclucion cyst) present. No rash, tenderness or injury.         Left: No rash, tenderness, lesion or injury.       Vagina: Normal. No signs of injury. No vaginal discharge, erythema or bleeding.      Cervix: No lesion or cervical bleeding.      Uterus: Absent.       Adnexa:         Right: No mass or tenderness.          Left: No mass or tenderness.        Rectum: Normal. No mass.          Comments: BSU megative  Urethral Meatus normal  Vagina atrophic   Vaginal relaxed walls  Bladder nontende  3 mm inclusion cyst right labia      Musculoskeletal:         General: No tenderness. Normal range of motion.      Cervical back: Normal range of motion and neck supple.   Lymphadenopathy:      Head:      Right side of head: No submental, submandibular, tonsillar, preauricular, posterior auricular or occipital adenopathy.      Left side of head: No submental, submandibular, tonsillar, preauricular, posterior auricular or occipital adenopathy.      Cervical:      Right cervical: No superficial, deep or posterior cervical adenopathy.     Left cervical: No superficial, deep or posterior cervical adenopathy.      Lower Body: No right inguinal adenopathy. No left inguinal adenopathy.   Skin:     General: Skin is warm and dry.      Findings: No bruising, erythema or rash.   Neurological:      Mental Status: She is alert and oriented to person, place, and time.      Motor: No abnormal muscle tone.      Coordination: Coordination normal.   Psychiatric:         Mood and Affect: Mood normal.         Behavior: Behavior normal.         Thought Content: Thought content normal.         Judgment: Judgment normal.            Assessment & Plan       Diagnoses and all orders for this visit:    1. Well woman exam with routine  gynecological exam (Primary)  -     Liquid-based Pap Smear, Screening    2. Menopausal state  -     DEXA Bone Density Axial; Future    3. Post-menopausal  -     DEXA Bone Density Axial; Future    Patient is counseled re: BSE, diet, exercise, mammogram, calcium and Vit. D3            Merlyn Sanabria, APRN  8/8/2022

## 2022-08-10 LAB
GEN CATEG CVX/VAG CYTO-IMP: NORMAL
HPV I/H RISK 4 DNA CVX QL PROBE+SIG AMP: NOT DETECTED
LAB AP CASE REPORT: NORMAL
LAB AP GYN ADDITIONAL INFORMATION: NORMAL
LAB AP GYN OTHER FINDINGS: NORMAL
Lab: NORMAL
PATH INTERP SPEC-IMP: NORMAL
STAT OF ADQ CVX/VAG CYTO-IMP: NORMAL

## 2022-08-17 ENCOUNTER — HOSPITAL ENCOUNTER (OUTPATIENT)
Dept: BONE DENSITY | Facility: HOSPITAL | Age: 72
Discharge: HOME | End: 2022-08-17
Admitting: NURSE PRACTITIONER

## 2022-08-17 DIAGNOSIS — Z78.0 POST-MENOPAUSAL: ICD-10-CM

## 2022-08-17 PROCEDURE — 77080 DXA BONE DENSITY AXIAL: CPT

## 2023-12-11 ENCOUNTER — OFFICE VISIT (OUTPATIENT)
Dept: GASTROENTEROLOGY | Facility: CLINIC | Age: 73
End: 2023-12-11
Payer: MEDICARE

## 2023-12-11 VITALS
OXYGEN SATURATION: 98 % | HEART RATE: 68 BPM | TEMPERATURE: 98 F | SYSTOLIC BLOOD PRESSURE: 124 MMHG | WEIGHT: 155 LBS | HEIGHT: 60 IN | DIASTOLIC BLOOD PRESSURE: 72 MMHG | BODY MASS INDEX: 30.43 KG/M2

## 2023-12-11 DIAGNOSIS — Z86.010 HX OF ADENOMATOUS COLONIC POLYPS: Primary | ICD-10-CM

## 2023-12-11 PROBLEM — Z86.0101 HX OF ADENOMATOUS COLONIC POLYPS: Status: ACTIVE | Noted: 2018-10-16

## 2023-12-11 RX ORDER — MULTIVIT-MIN/IRON/FOLIC ACID/K 18-600-40
1 CAPSULE ORAL DAILY
COMMUNITY

## 2023-12-11 RX ORDER — HYDROCHLOROTHIAZIDE 12.5 MG/1
12.5 TABLET ORAL DAILY
COMMUNITY
Start: 2023-09-28

## 2023-12-11 NOTE — PROGRESS NOTES
Primary Physician: Carlos Sanford MD    Chief Complaint   Patient presents with    Colon Cancer Screening     Pt presents today for colon recall-last colon was 12/3/2018; Personal history of adenomatous and hyperplastic polyps       Subjective     Mally De La Cruz is a 73 y.o. female.    HPI  Personal history of adenomatous colon polyps  Last Colonoscopy 12/3/2018:  Diverticulosis throughout the entire colon,  and a 5 mm adenomatous polyp resected from the cecum.  No family hx colon cancer.  No change in bowel habits. No diarrhea, constipation, abd pain or rectal bleeding.      Past Medical History:   Diagnosis Date    Diverticulosis     GERD (gastroesophageal reflux disease)     History of adenomatous polyp of colon     History of basal cell carcinoma (BCC)     Left calf, nose, left forearm, right side of chest    History of colon polyps     Hyperlipidemia     Hypertension     Schatzki's ring     Spinal headache        Past Surgical History:   Procedure Laterality Date    ADENOIDECTOMY      BASAL CELL CARCINOMA EXCISION      BLEPHAROPLASTY      CATARACT EXTRACTION Bilateral     COLONOSCOPY N/A 12/03/2018    Diverticulosis in the entire examined colon; One 5mm tubular adenonatous polyp in the cecum; The examination was otherwise normal on direct and retroflexion views; Repeat 5 years    COLONOSCOPY  11/14/2013    One 6mm tubular adenomatous polyp in the transverse colon; Diverticulosis in the entire examined colon; Repeat 5 years    COLONOSCOPY  12/18/2009    One 5mm hyperplastic polyp in the transverse colon; Diverticulosis in the left colon; Repeat 4 years    COLONOSCOPY  01/31/2003    Diverticulosis; One 6mm polyp in the sigmoid colon-path shows benign large bowel mucosa demonstrating intramucosal mature lymphoid aggregate and mild non-specific reactive changes; Small internal hemorrhoids    ENDOSCOPY  12/16/2009    HH; Schatzki's ring-Dilated to 20mm    ENDOSCOPY  02/06/2003    Normal endoscopy     REDUCTION MAMMAPLASTY Bilateral 2015    REPLACEMENT TOTAL KNEE Left 2018    RETINAL DETACHMENT REPAIR Bilateral     TONSILLECTOMY      TUBAL ABDOMINAL LIGATION          Current Outpatient Medications:     acetaminophen (TYLENOL) 500 MG tablet, Take 1 tablet by mouth As Needed., Disp: , Rfl:     Ascorbic Acid (Vitamin C) 500 MG capsule, Take 1 capsule by mouth Daily., Disp: , Rfl:     atorvastatin (LIPITOR) 20 MG tablet, Take 1 tablet by mouth Daily., Disp: , Rfl:     Cholecalciferol (VITAMIN D) 2000 units capsule, Take 1 capsule by mouth Daily., Disp: , Rfl:     docusate sodium (COLACE) 100 MG capsule, Take 1 capsule by mouth As Needed., Disp: , Rfl:     hydroCHLOROthiazide (HYDRODIURIL) 12.5 MG tablet, Take 1 tablet by mouth Daily., Disp: , Rfl:     Multiple Vitamins-Minerals (PRESERVISION AREDS 2+MULTI VIT PO), Take 1 tablet by mouth Daily., Disp: , Rfl:     omeprazole (priLOSEC) 20 MG capsule, Take 1 capsule by mouth Daily., Disp: , Rfl:     Psyllium (METAMUCIL FIBER PO), Take 1 teaspoon(s) by mouth Daily., Disp: , Rfl:     valACYclovir (VALTREX) 500 MG tablet, Take 1 tablet by mouth Daily., Disp: , Rfl:     Allergies   Allergen Reactions    Nsaids Itching and Swelling    Sulfa Antibiotics Nausea And Vomiting    Ace Inhibitors Cough       Social History     Socioeconomic History    Marital status:    Tobacco Use    Smoking status: Never    Smokeless tobacco: Never   Vaping Use    Vaping Use: Never used   Substance and Sexual Activity    Alcohol use: Yes     Comment: Socially    Drug use: No    Sexual activity: Not Currently     Partners: Male     Birth control/protection: Tubal ligation       Family History   Adopted: Yes       Review of Systems   Constitutional:  Negative for unexpected weight change.   Respiratory:  Negative for shortness of breath.    Cardiovascular:  Negative for chest pain.       Objective     /72 (BP Location: Left arm, Patient Position: Sitting, Cuff Size: Adult)   Pulse 68   " Temp 98 °F (36.7 °C) (Infrared)   Ht 152.4 cm (60\")   Wt 70.3 kg (155 lb)   SpO2 98%   Breastfeeding No   BMI 30.27 kg/m²     Physical Exam  Vitals reviewed.   Constitutional:       Appearance: Normal appearance.   Cardiovascular:      Rate and Rhythm: Normal rate and regular rhythm.      Heart sounds: Normal heart sounds.   Pulmonary:      Effort: Pulmonary effort is normal.      Breath sounds: Normal breath sounds.   Neurological:      Mental Status: She is alert.               IMPRESSION/PLAN:    Assessment & Plan      Problem List Items Addressed This Visit          Gastrointestinal Abdominal     Hx of adenomatous colonic polyps - Primary    Overview     Last  Colonoscopy 12/3/2018:  Diverticulosis throughout the entire colon, And a 5 mm adenomatous polyp resected from the cecum.         Relevant Orders    Case Request (Completed)     Colonoscopy per Dr Louie Gomez          ..The risks, benefits, and alternatives of colonoscopy were reviewed with the patient today.  Risks including perforation of the colon possibly requiring surgery or colostomy.  Additional risks include risk of bleeding from biopsies or removal of colon tissue.  There is also the risk of a drug reaction or problems with anesthesia.  This will be discussed with the further by the anesthesia team on the day of the procedure.  Lastly there is a possibility of missing a colon polyp or cancer.  The benefits include the diagnosis and management of disease of the colon and rectum.  Alternatives to colonoscopy include barium enema, laboratory testing, radiographic evaluation, or no intervention.  The patient verbalizes understanding and agrees.    In accordance with requirements under the Affordable Care Act, [facility] has provided pricing for all hospital services and items on each of its websites. However, a patient's actual cost may differ based on the services the patient receives to meet individual healthcare needs and based on " the benefits provided under the patient’s insurance coverage.        Patricia Gonzalez, APRN  12/11/23  13:20 CST    Part of this note may be an electronic transcription/translation of spoken language to printed text.

## 2024-02-05 ENCOUNTER — HOSPITAL ENCOUNTER (OUTPATIENT)
Facility: HOSPITAL | Age: 74
Setting detail: HOSPITAL OUTPATIENT SURGERY
Discharge: HOME | End: 2024-02-05
Attending: INTERNAL MEDICINE | Admitting: INTERNAL MEDICINE
Payer: MEDICARE

## 2024-02-05 ENCOUNTER — ANESTHESIA (OUTPATIENT)
Dept: GASTROENTEROLOGY | Facility: HOSPITAL | Age: 74
End: 2024-02-05
Payer: MEDICARE

## 2024-02-05 ENCOUNTER — ANESTHESIA EVENT (OUTPATIENT)
Dept: GASTROENTEROLOGY | Facility: HOSPITAL | Age: 74
End: 2024-02-05
Payer: MEDICARE

## 2024-02-05 VITALS
OXYGEN SATURATION: 98 % | RESPIRATION RATE: 17 BRPM | HEIGHT: 60 IN | BODY MASS INDEX: 31.22 KG/M2 | HEART RATE: 70 BPM | TEMPERATURE: 96.9 F | WEIGHT: 159 LBS | SYSTOLIC BLOOD PRESSURE: 104 MMHG | DIASTOLIC BLOOD PRESSURE: 67 MMHG

## 2024-02-05 DIAGNOSIS — Z86.010 HX OF ADENOMATOUS COLONIC POLYPS: ICD-10-CM

## 2024-02-05 PROCEDURE — 45385 COLONOSCOPY W/LESION REMOVAL: CPT | Performed by: INTERNAL MEDICINE

## 2024-02-05 PROCEDURE — 25010000002 PROPOFOL 10 MG/ML EMULSION

## 2024-02-05 PROCEDURE — 25810000003 SODIUM CHLORIDE 0.9 % SOLUTION: Performed by: NURSE ANESTHETIST, CERTIFIED REGISTERED

## 2024-02-05 PROCEDURE — 88305 TISSUE EXAM BY PATHOLOGIST: CPT | Performed by: INTERNAL MEDICINE

## 2024-02-05 RX ORDER — LIDOCAINE HYDROCHLORIDE 20 MG/ML
INJECTION, SOLUTION EPIDURAL; INFILTRATION; INTRACAUDAL; PERINEURAL AS NEEDED
Status: DISCONTINUED | OUTPATIENT
Start: 2024-02-05 | End: 2024-02-05 | Stop reason: SURG

## 2024-02-05 RX ORDER — LIDOCAINE HYDROCHLORIDE 10 MG/ML
0.5 INJECTION, SOLUTION EPIDURAL; INFILTRATION; INTRACAUDAL; PERINEURAL ONCE AS NEEDED
Status: DISCONTINUED | OUTPATIENT
Start: 2024-02-05 | End: 2024-02-05 | Stop reason: HOSPADM

## 2024-02-05 RX ORDER — PROPOFOL 10 MG/ML
VIAL (ML) INTRAVENOUS AS NEEDED
Status: DISCONTINUED | OUTPATIENT
Start: 2024-02-05 | End: 2024-02-05 | Stop reason: SURG

## 2024-02-05 RX ORDER — SODIUM CHLORIDE 0.9 % (FLUSH) 0.9 %
10 SYRINGE (ML) INJECTION AS NEEDED
Status: DISCONTINUED | OUTPATIENT
Start: 2024-02-05 | End: 2024-02-05 | Stop reason: HOSPADM

## 2024-02-05 RX ORDER — SODIUM CHLORIDE 9 MG/ML
500 INJECTION, SOLUTION INTRAVENOUS CONTINUOUS PRN
Status: DISCONTINUED | OUTPATIENT
Start: 2024-02-05 | End: 2024-02-05 | Stop reason: HOSPADM

## 2024-02-05 RX ADMIN — LIDOCAINE HYDROCHLORIDE 80 MG: 20 INJECTION, SOLUTION EPIDURAL; INFILTRATION; INTRACAUDAL; PERINEURAL at 08:05

## 2024-02-05 RX ADMIN — SODIUM CHLORIDE 500 ML: 9 INJECTION, SOLUTION INTRAVENOUS at 07:50

## 2024-02-05 RX ADMIN — PROPOFOL INJECTABLE EMULSION 280 MG: 10 INJECTION, EMULSION INTRAVENOUS at 08:05

## 2024-02-05 NOTE — ANESTHESIA PREPROCEDURE EVALUATION
Anesthesia Evaluation     Patient summary reviewed   no history of anesthetic complications:   NPO Solid Status: > 8 hours  NPO Liquid Status: > 2 hours           Airway   Mallampati: III  TM distance: <3 FB  Neck ROM: full  Dental          Pulmonary - normal exam    breath sounds clear to auscultation  (-) asthma, recent URI, sleep apnea, not a smoker  Cardiovascular - normal exam  Exercise tolerance: good (4-7 METS)    Rhythm: regular  Rate: normal    (+) hypertension well controlled, hyperlipidemia  (-) pacemaker, past MI, angina, cardiac stents      Neuro/Psych  (-) seizures, TIA, CVA  GI/Hepatic/Renal/Endo    (+) obesity, GERD  (-) liver disease, no renal disease, diabetes    Musculoskeletal     Abdominal    Substance History      OB/GYN          Other                          Anesthesia Plan    ASA 2     MAC     intravenous induction     Anesthetic plan, risks, benefits, and alternatives have been provided, discussed and informed consent has been obtained with: patient.    Plan discussed with CRNA.

## 2024-02-05 NOTE — H&P
Chief Complaint:   Colon polyps    Subjective     HPI:   Adenoma removed 12/2018.  Here for surveillance.    Past Medical History:   Past Medical History:   Diagnosis Date    Diverticulosis     GERD (gastroesophageal reflux disease)     History of adenomatous polyp of colon     History of basal cell carcinoma (BCC)     Left calf, nose, left forearm, right side of chest    History of colon polyps     Hyperlipidemia     Hypertension     Schatzki's ring     Spinal headache        Past Surgical History:  Past Surgical History:   Procedure Laterality Date    ADENOIDECTOMY      BASAL CELL CARCINOMA EXCISION      BLEPHAROPLASTY      CATARACT EXTRACTION Bilateral     COLONOSCOPY N/A 12/03/2018    Diverticulosis in the entire examined colon; One 5mm tubular adenonatous polyp in the cecum; The examination was otherwise normal on direct and retroflexion views; Repeat 5 years    COLONOSCOPY  11/14/2013    One 6mm tubular adenomatous polyp in the transverse colon; Diverticulosis in the entire examined colon; Repeat 5 years    COLONOSCOPY  12/18/2009    One 5mm hyperplastic polyp in the transverse colon; Diverticulosis in the left colon; Repeat 4 years    COLONOSCOPY  01/31/2003    Diverticulosis; One 6mm polyp in the sigmoid colon-path shows benign large bowel mucosa demonstrating intramucosal mature lymphoid aggregate and mild non-specific reactive changes; Small internal hemorrhoids    ENDOSCOPY  12/16/2009    HH; Schatzki's ring-Dilated to 20mm    ENDOSCOPY  02/06/2003    Normal endoscopy    REDUCTION MAMMAPLASTY Bilateral 2015    REPLACEMENT TOTAL KNEE Left 2018    RETINAL DETACHMENT REPAIR Bilateral     TONSILLECTOMY      TUBAL ABDOMINAL LIGATION          Family History:  Family History   Adopted: Yes       Social History:   reports that she has never smoked. She has never used smokeless tobacco. She reports current alcohol use. She reports that she does not use drugs.    Medications:   Medications Prior to Admission  "  Medication Sig Dispense Refill Last Dose    acetaminophen (TYLENOL) 500 MG tablet Take 1 tablet by mouth As Needed.   2/4/2024    atorvastatin (LIPITOR) 20 MG tablet Take 1 tablet by mouth Daily.   2/4/2024    hydroCHLOROthiazide (HYDRODIURIL) 12.5 MG tablet Take 1 tablet by mouth Daily.   2/5/2024    omeprazole (priLOSEC) 20 MG capsule Take 1 capsule by mouth Daily.   2/4/2024    valACYclovir (VALTREX) 500 MG tablet Take 1 tablet by mouth Daily.   2/3/2024    Ascorbic Acid (Vitamin C) 500 MG capsule Take 1 capsule by mouth Daily.   2/3/2024    Cholecalciferol (VITAMIN D) 2000 units capsule Take 1 capsule by mouth Daily.   2/3/2024    docusate sodium (COLACE) 100 MG capsule Take 1 capsule by mouth As Needed.   2/2/2024    Multiple Vitamins-Minerals (PRESERVISION AREDS 2+MULTI VIT PO) Take 1 tablet by mouth Daily.   2/3/2024    Psyllium (METAMUCIL FIBER PO) Take 1 teaspoon(s) by mouth Daily.   1/29/2024       Allergies:  Nsaids, Sulfa antibiotics, and Ace inhibitors    ROS:    Resp: No SOA  Cardiovascular: No CP      Objective     /73 (Patient Position: Sitting)   Pulse 66   Temp 96.9 °F (36.1 °C) (Temporal)   Resp 20   Ht 152.4 cm (60\")   Wt 72.1 kg (159 lb)   SpO2 95%   BMI 31.05 kg/m²     Physical Exam   Constitutional: Patient is oriented to person, place, and in no distress.  Pulmonary/Chest: No distress.  No audible wheezes  Psychiatric: Mood, memory, affect and judgment appear normal.     Assessment & Plan     Diagnosis:  Colon polyps    Anticipated Surgical Procedure:  Colonoscopy    The risks, benefits, and alternatives of colonoscopy were reviewed with the patient today.  Risks including perforation of the colon possibly requiring surgery or colostomy.  Additional risks include risk of bleeding from biopsies or removal of colon tissue.  There is also the risk of a drug reaction or problems with anesthesia.  This will be discussed with the patient further by the anesthesia team on the day of " the procedure.  Lastly there is a possibility of missing a colon polyp or cancer.  The benefits include the diagnosis and management of disease of the colon and rectum.  Alternatives to colonoscopy include barium enema, laboratory testing, radiographic evaluation, or no intervention.  The patient verbalizes understanding and agrees.        Please note that portions of this note were completed with a voice recognition program.

## 2024-02-05 NOTE — ANESTHESIA POSTPROCEDURE EVALUATION
"Patient: Mally De La Cruz    Procedure Summary       Date: 02/05/24 Room / Location: [facility] ENDOSCOPY 6 / [facility]    Anesthesia Start: 0803 Anesthesia Stop: 0833    Procedure: COLONOSCOPY WITH ANESTHESIA Diagnosis:       Hx of adenomatous colonic polyps      (Hx of adenomatous colonic polyps [Z86.010])    Surgeons: Shelly Palma MD Provider: Fidencio Fraga CRNA    Anesthesia Type: MAC ASA Status: 2            Anesthesia Type: MAC    Vitals  Vitals Value Taken Time   /60 02/05/24 0832   Temp     Pulse 77 02/05/24 0833   Resp     SpO2 98 % 02/05/24 0833   Vitals shown include unfiled device data.        Post Anesthesia Care and Evaluation    Patient location during evaluation: PHASE II  Patient participation: complete - patient participated  Level of consciousness: awake  Pain management: adequate    Airway patency: patent  Anesthetic complications: No anesthetic complications    Cardiovascular status: acceptable  Respiratory status: acceptable  Hydration status: acceptable    Comments: /73 (Patient Position: Sitting)   Pulse 66   Temp 96.9 °F (36.1 °C) (Temporal)   Resp 20   Ht 152.4 cm (60\")   Wt 72.1 kg (159 lb)   SpO2 95%   BMI 31.05 kg/m²       "

## 2024-02-05 NOTE — NURSING NOTE
Pt. Complain of mild cramping in postop.  Pt. Changing positions and walking and used restroom.  Pt able to pass a small amount of gas and belch.  Pt. States it feels like gas.  Bowel sounds present.  Pt. Has no other complaints or symptoms.  Pt. Encouraged to change posittion, bare down and pass gas, and walk.  Pt. Intstructed to return to ER for any further complications.

## 2024-02-08 LAB
CYTO UR: NORMAL
LAB AP CASE REPORT: NORMAL
Lab: NORMAL
PATH REPORT.FINAL DX SPEC: NORMAL
PATH REPORT.GROSS SPEC: NORMAL

## 2024-04-15 ENCOUNTER — OFFICE VISIT (OUTPATIENT)
Dept: INTERNAL MEDICINE | Facility: CLINIC | Age: 74
End: 2024-04-15
Payer: MEDICARE

## 2024-04-15 VITALS
BODY MASS INDEX: 31.8 KG/M2 | TEMPERATURE: 97.7 F | DIASTOLIC BLOOD PRESSURE: 74 MMHG | HEIGHT: 60 IN | OXYGEN SATURATION: 100 % | SYSTOLIC BLOOD PRESSURE: 122 MMHG | WEIGHT: 162 LBS | HEART RATE: 76 BPM

## 2024-04-15 DIAGNOSIS — E78.49 OTHER HYPERLIPIDEMIA: ICD-10-CM

## 2024-04-15 DIAGNOSIS — J34.89 SINUS PRESSURE: ICD-10-CM

## 2024-04-15 DIAGNOSIS — R20.8 BURNING SENSATION OF FEET: ICD-10-CM

## 2024-04-15 DIAGNOSIS — Z76.89 ENCOUNTER TO ESTABLISH CARE WITH NEW DOCTOR: Primary | ICD-10-CM

## 2024-04-15 DIAGNOSIS — I10 ESSENTIAL HYPERTENSION: ICD-10-CM

## 2024-04-15 PROCEDURE — 1160F RVW MEDS BY RX/DR IN RCRD: CPT | Performed by: INTERNAL MEDICINE

## 2024-04-15 PROCEDURE — 3074F SYST BP LT 130 MM HG: CPT | Performed by: INTERNAL MEDICINE

## 2024-04-15 PROCEDURE — 99204 OFFICE O/P NEW MOD 45 MIN: CPT | Performed by: INTERNAL MEDICINE

## 2024-04-15 PROCEDURE — 3078F DIAST BP <80 MM HG: CPT | Performed by: INTERNAL MEDICINE

## 2024-04-15 PROCEDURE — 1159F MED LIST DOCD IN RCRD: CPT | Performed by: INTERNAL MEDICINE

## 2024-04-15 RX ORDER — FLUTICASONE PROPIONATE 50 MCG
2 SPRAY, SUSPENSION (ML) NASAL DAILY
Qty: 16 G | Refills: 1 | Status: SHIPPED | OUTPATIENT
Start: 2024-04-15

## 2024-04-15 RX ORDER — MULTIVIT WITH MINERALS/LUTEIN
1000 TABLET ORAL DAILY
COMMUNITY

## 2024-04-15 NOTE — PROGRESS NOTES
"    Chief Complaint  Establish Care (Previous PCP Dr. Carlos Sanford) and Fuzzy Feeling in Head (Approximately 3 years, occurs everyday; pt denies lightheadedness and dizziness )    Subjective        Mally De La Cruz presents to [facility] PRIMARY [facility]ARE  History of Present Illness  See below.     Objective   Vital Signs:  /74 (BP Location: Left arm, Patient Position: Sitting, Cuff Size: Adult)   Pulse 76   Temp 97.7 °F (36.5 °C) (Infrared)   Ht 152.4 cm (60\")   Wt 73.5 kg (162 lb)   SpO2 100%   BMI 31.64 kg/m²   Estimated body mass index is 31.64 kg/m² as calculated from the following:    Height as of this encounter: 152.4 cm (60\").    Weight as of this encounter: 73.5 kg (162 lb).     BMI is >= 30 and <35. (Class 1 Obesity). The following options were offered after discussion;: weight loss educational material (shared in after visit summary)    Physical Exam  HENT:      Head: Normocephalic and atraumatic.   Eyes:      Conjunctiva/sclera: Conjunctivae normal.      Pupils: Pupils are equal, round, and reactive to light.   Cardiovascular:      Rate and Rhythm: Normal rate and regular rhythm.      Heart sounds: Normal heart sounds.   Pulmonary:      Effort: Pulmonary effort is normal. No respiratory distress.      Breath sounds: Normal breath sounds.   Musculoskeletal:         General: No swelling.      Cervical back: Neck supple.   Skin:     General: Skin is warm and dry.      Findings: No rash.      Comments: Minor varicosities of the lower extremities.   Neurological:      General: No focal deficit present.      Mental Status: She is alert and oriented to person, place, and time.   Psychiatric:         Mood and Affect: Mood normal.         Behavior: Behavior normal.         Thought Content: Thought content normal.         Judgment: Judgment normal.        Result Review :  Pap normal in August 2022.    Last mammogram was benign in July 2022.    Colonoscopy with Dr. Palma in February " "2024:    Tubular adenomas.    Last labs in November at Dr. Sanford's office, which we will try to obtain.          Assessment and Plan   Diagnoses and all orders for this visit:    1. Encounter to establish care with new doctor (Primary)    2. Sinus pressure, PND, \"fuzzy feeling\"  -     fluticasone (FLONASE) 50 MCG/ACT nasal spray; 2 sprays into the nostril(s) as directed by provider Daily.  Dispense: 16 g; Refill: 1    3. Essential hypertension    4. Other hyperlipidemia    5. Burning sensation of feet       Presents today to establish care.  She has previously been a patient of Dr. Sanford.  She is a retired registered nurse.  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    She states that she has had a strange sensation of her head for around 3 years.  It is ever present.  She describes it as a \"fuzzy feeling.\"  She does not describe this as lightheadedness or dizziness.  No concerns for orthostasis on her part.  She was asked numerous questions to try and pin this down.  She does not have headache often at all.  She does have postnasal drip and a feeling of fullness in her throat in the mornings.  She clears her sputum.  She does state that the fuzzy feeling occurs mostly around her eyes and behind her frontal sinuses.  She would not describe herself as someone who has a a lot of difficulty with allergies.  We have decided to treat her with fluticasone for the time being and then check in with her in a few weeks.  I would rather do this prior to considering any imaging or alternative therapies.  She is agreeable.    She was having problems with orthostasis about a year ago.  She had been on losartan and HCTZ.  She had several days in a row of systolic blood pressures around 100 with not feeling well.  She was taken off of losartan and remains on HCTZ.  Blood pressure 122/74 today.  " Continue HCTZ monotherapy.    She has a longstanding history of hyperlipidemia and has been on atorvastatin for many years.  We will review her most recent lipid panel from Dr. Sanford's office.    She is worried about possible neuropathy of her bilateral feet.  She has a burning sensation at times.  She took gabapentin previously and it helped for a very small amount of time and then she started to have the issue again.  She was switched to Lyrica and it was ineffective.  She does not know if she has ever had a B12 checked.  She has had normal hemoglobin A1c values in the past.  The rest of her legs were not involved.  No back pain.  Will review labs from Dr. Sanford prior to making further recommendations.    She has a history of GERD without esophagitis.  Her endoscopy in 2009 showed hiatal hernia with a Schatzki's ring that was dilated.  This was done by Dr. Palma.  She takes her omeprazole at night and feels that she is not having increased reflux symptoms.    Last mammogram in the system was in July 2022.  Microcalcifications seen in the left breast and appear benign.  She states that she is confident that she had another mammogram in 2023 at [facility] [place].  Again, it is not on file.  We will further investigate.    Pap was normal in August 2022.    DEXA scan was normal in August 2022.  Due again this year.  She understands.    Repeat colonoscopy due in February 2029 if she wishes to continue screening.    Sees Dr. Chow for eye care.  Due do to see him again soon.  Gets Botox for eye twitching.  Has had previous cryotherapy for retinal issues with Dr. Sanabria.    Plan to have her back in 3 months for her annual Medicare wellness visit or sooner if problems.  I set a reminder to check in with her the 2nd week of May.        Follow Up   Return in about 3 months (around 7/15/2024) for Medicare Wellness.  Patient was given instructions and counseling regarding her condition or for health maintenance advice. Please see  specific information pulled into the AVS if appropriate.      KARYN Osorio DO       Electronically signed by RONNY Osorio DO, 04/15/24, 2:36 PM CDT.

## 2024-04-17 ENCOUNTER — TRANSCRIBE ORDERS (OUTPATIENT)
Dept: ADMINISTRATIVE | Facility: HOSPITAL | Age: 74
End: 2024-04-17
Payer: MEDICARE

## 2024-04-17 DIAGNOSIS — Z12.31 ENCOUNTER FOR SCREENING MAMMOGRAM FOR MALIGNANT NEOPLASM OF BREAST: Primary | ICD-10-CM

## 2024-04-19 ENCOUNTER — TELEPHONE (OUTPATIENT)
Dept: INTERNAL MEDICINE | Facility: CLINIC | Age: 74
End: 2024-04-19

## 2024-04-19 LAB
NCCN CRITERIA FLAG: NORMAL
TYRER CUZICK SCORE: 2.5

## 2024-04-19 NOTE — TELEPHONE ENCOUNTER
"  Caller: Mally De La Cruz \"Carmen\"    Relationship: Self    Best call back number: [phone] (Mobile)         Who are you requesting to speak with (clinical staff, provider,  specific staff member): CLINICAL      What was the call regarding: THE PATIENT STATES THAT SHE WOULD LIKE TO KNOW IF SHE NEEDS TO GET THE B 12 LAB BEFORE HER APPOINTMENT ON 7/15/24      "

## 2024-04-22 ENCOUNTER — HOSPITAL ENCOUNTER (OUTPATIENT)
Dept: MAMMOGRAPHY | Facility: HOSPITAL | Age: 74
Discharge: HOME | End: 2024-04-22
Admitting: OBSTETRICS & GYNECOLOGY
Payer: MEDICARE

## 2024-04-22 ENCOUNTER — PATIENT ROUNDING (BHMG ONLY) (OUTPATIENT)
Dept: INTERNAL MEDICINE | Facility: CLINIC | Age: 74
End: 2024-04-22
Payer: MEDICARE

## 2024-04-22 DIAGNOSIS — Z12.31 ENCOUNTER FOR SCREENING MAMMOGRAM FOR MALIGNANT NEOPLASM OF BREAST: ICD-10-CM

## 2024-04-22 DIAGNOSIS — E55.9 VITAMIN D DEFICIENCY: ICD-10-CM

## 2024-04-22 DIAGNOSIS — E53.8 B12 DEFICIENCY: Primary | ICD-10-CM

## 2024-04-22 DIAGNOSIS — Z11.59 ENCOUNTER FOR HEPATITIS C SCREENING TEST FOR LOW RISK PATIENT: ICD-10-CM

## 2024-04-22 PROCEDURE — 77063 BREAST TOMOSYNTHESIS BI: CPT

## 2024-04-22 PROCEDURE — 77067 SCR MAMMO BI INCL CAD: CPT

## 2024-04-22 NOTE — PROGRESS NOTES
A [employer] message has been sent to the patient for patient rounding with [facility].

## 2024-04-24 ENCOUNTER — HOSPITAL ENCOUNTER (OUTPATIENT)
Dept: ULTRASOUND IMAGING | Facility: HOSPITAL | Age: 74
Discharge: HOME | End: 2024-04-24
Admitting: OBSTETRICS & GYNECOLOGY
Payer: MEDICARE

## 2024-04-24 DIAGNOSIS — R92.8 ABNORMAL MAMMOGRAM: ICD-10-CM

## 2024-04-24 PROCEDURE — 76642 ULTRASOUND BREAST LIMITED: CPT

## 2024-04-25 DIAGNOSIS — N60.02 SOLITARY CYST OF LEFT BREAST: Primary | ICD-10-CM

## 2024-05-07 ENCOUNTER — TELEPHONE (OUTPATIENT)
Dept: INTERNAL MEDICINE | Facility: CLINIC | Age: 74
End: 2024-05-07
Payer: MEDICARE

## 2024-05-09 ENCOUNTER — TELEPHONE (OUTPATIENT)
Dept: INTERNAL MEDICINE | Facility: CLINIC | Age: 74
End: 2024-05-09

## 2024-05-09 DIAGNOSIS — I10 ESSENTIAL HYPERTENSION: Primary | ICD-10-CM

## 2024-05-09 DIAGNOSIS — E78.49 OTHER HYPERLIPIDEMIA: ICD-10-CM

## 2024-05-09 DIAGNOSIS — R73.03 PREDIABETES: ICD-10-CM

## 2024-05-09 NOTE — TELEPHONE ENCOUNTER
"  Caller: Mally De La Cruz \"Carmen\"    Relationship: Self    Best call back number: [phone]      Who are you requesting to speak with     ANA PAULA ORR    Do you know the name of the person who called:     MALLY    What was the call regarding:     DR. NULL ASKED PATIENT TO CALL BACK AND RELAY HOW SHE IS FEELING FROM NEW PATIENT APPOINTMENT    NASAL SPRAY - FLONASE - MAYBE WORKING A LITTLE BIT.  NOT MUCH DIFFERENCE    QUESTION ON HEAD - STILL HAS THE FUZZYNESS IN HEAD    Is it okay if the provider responds through MyChart: YES      [facility] [address] [place], [place] - [address] [facility] [phone] [facility] [phone] FX      WILL BE LEAVING [facility] SATURDAY THE 11TH WILL BE WILL BACK IN [facility] TUESDAY THE 21 IS ORDERING MEDS      "

## 2024-05-10 DIAGNOSIS — E78.49 OTHER HYPERLIPIDEMIA: ICD-10-CM

## 2024-05-10 DIAGNOSIS — I10 ESSENTIAL HYPERTENSION: ICD-10-CM

## 2024-05-10 DIAGNOSIS — R73.03 PREDIABETES: ICD-10-CM

## 2024-05-10 DIAGNOSIS — J34.89 SINUS PRESSURE: Primary | ICD-10-CM

## 2024-05-11 LAB
ALBUMIN SERPL-MCNC: 4.3 G/DL (ref 3.5–5.2)
ALBUMIN/GLOB SERPL: 2 G/DL
ALP SERPL-CCNC: 56 U/L (ref 39–117)
ALT SERPL-CCNC: 15 U/L (ref 1–33)
AST SERPL-CCNC: 13 U/L (ref 1–32)
BASOPHILS # BLD AUTO: 0.04 10*3/MM3 (ref 0–0.2)
BASOPHILS NFR BLD AUTO: 0.7 % (ref 0–1.5)
BILIRUB SERPL-MCNC: 0.3 MG/DL (ref 0–1.2)
BUN SERPL-MCNC: 18 MG/DL (ref 8–23)
BUN/CREAT SERPL: 26.9 (ref 7–25)
CALCIUM SERPL-MCNC: 9.4 MG/DL (ref 8.6–10.5)
CHLORIDE SERPL-SCNC: 107 MMOL/L (ref 98–107)
CHOLEST SERPL-MCNC: 169 MG/DL (ref 0–200)
CO2 SERPL-SCNC: 27.5 MMOL/L (ref 22–29)
CREAT SERPL-MCNC: 0.67 MG/DL (ref 0.57–1)
EGFRCR SERPLBLD CKD-EPI 2021: 91.3 ML/MIN/1.73
EOSINOPHIL # BLD AUTO: 0.07 10*3/MM3 (ref 0–0.4)
EOSINOPHIL NFR BLD AUTO: 1.2 % (ref 0.3–6.2)
ERYTHROCYTE [DISTWIDTH] IN BLOOD BY AUTOMATED COUNT: 12.8 % (ref 12.3–15.4)
GLOBULIN SER CALC-MCNC: 2.1 GM/DL
GLUCOSE SERPL-MCNC: 106 MG/DL (ref 65–99)
HBA1C MFR BLD: 5.9 % (ref 4.8–5.6)
HCT VFR BLD AUTO: 38.6 % (ref 34–46.6)
HDLC SERPL-MCNC: 56 MG/DL (ref 40–60)
HGB BLD-MCNC: 13.1 G/DL (ref 12–15.9)
IMM GRANULOCYTES # BLD AUTO: 0 10*3/MM3 (ref 0–0.05)
IMM GRANULOCYTES NFR BLD AUTO: 0 % (ref 0–0.5)
LDLC SERPL CALC-MCNC: 96 MG/DL (ref 0–100)
LYMPHOCYTES # BLD AUTO: 2.38 10*3/MM3 (ref 0.7–3.1)
LYMPHOCYTES NFR BLD AUTO: 41.8 % (ref 19.6–45.3)
MCH RBC QN AUTO: 32.5 PG (ref 26.6–33)
MCHC RBC AUTO-ENTMCNC: 33.9 G/DL (ref 31.5–35.7)
MCV RBC AUTO: 95.8 FL (ref 79–97)
MONOCYTES # BLD AUTO: 0.32 10*3/MM3 (ref 0.1–0.9)
MONOCYTES NFR BLD AUTO: 5.6 % (ref 5–12)
NEUTROPHILS # BLD AUTO: 2.89 10*3/MM3 (ref 1.7–7)
NEUTROPHILS NFR BLD AUTO: 50.7 % (ref 42.7–76)
NRBC BLD AUTO-RTO: 0 /100 WBC (ref 0–0.2)
PLATELET # BLD AUTO: 191 10*3/MM3 (ref 140–450)
POTASSIUM SERPL-SCNC: 4.3 MMOL/L (ref 3.5–5.2)
PROT SERPL-MCNC: 6.4 G/DL (ref 6–8.5)
RBC # BLD AUTO: 4.03 10*6/MM3 (ref 3.77–5.28)
SODIUM SERPL-SCNC: 146 MMOL/L (ref 136–145)
TRIGL SERPL-MCNC: 92 MG/DL (ref 0–150)
TSH SERPL DL<=0.005 MIU/L-ACNC: 1.93 UIU/ML (ref 0.27–4.2)
VLDLC SERPL CALC-MCNC: 17 MG/DL (ref 5–40)
WBC # BLD AUTO: 5.7 10*3/MM3 (ref 3.4–10.8)

## 2024-05-31 ENCOUNTER — HOSPITAL ENCOUNTER (OUTPATIENT)
Dept: CT IMAGING | Facility: HOSPITAL | Age: 74
Discharge: HOME | End: 2024-05-31
Payer: MEDICARE

## 2024-05-31 DIAGNOSIS — J34.89 SINUS PRESSURE: ICD-10-CM

## 2024-05-31 PROCEDURE — 70450 CT HEAD/BRAIN W/O DYE: CPT

## 2024-05-31 PROCEDURE — 70486 CT MAXILLOFACIAL W/O DYE: CPT

## 2024-06-03 DIAGNOSIS — R93.0 ABNORMAL CT OF PARANASAL SINUSES: Primary | ICD-10-CM

## 2024-06-12 ENCOUNTER — TELEPHONE (OUTPATIENT)
Dept: OTOLARYNGOLOGY | Facility: CLINIC | Age: 74
End: 2024-06-12
Payer: MEDICARE

## 2024-06-17 ENCOUNTER — TELEPHONE (OUTPATIENT)
Dept: OTOLARYNGOLOGY | Facility: CLINIC | Age: 74
End: 2024-06-17
Payer: MEDICARE

## 2024-07-15 ENCOUNTER — OFFICE VISIT (OUTPATIENT)
Dept: INTERNAL MEDICINE | Facility: CLINIC | Age: 74
End: 2024-07-15
Payer: MEDICARE

## 2024-07-15 VITALS
SYSTOLIC BLOOD PRESSURE: 122 MMHG | HEART RATE: 65 BPM | BODY MASS INDEX: 31.22 KG/M2 | TEMPERATURE: 96.8 F | DIASTOLIC BLOOD PRESSURE: 70 MMHG | HEIGHT: 60 IN | WEIGHT: 159 LBS | OXYGEN SATURATION: 98 %

## 2024-07-15 DIAGNOSIS — R73.03 PREDIABETES: ICD-10-CM

## 2024-07-15 DIAGNOSIS — E78.49 OTHER HYPERLIPIDEMIA: ICD-10-CM

## 2024-07-15 DIAGNOSIS — R20.8 BURNING SENSATION OF FEET: ICD-10-CM

## 2024-07-15 DIAGNOSIS — J34.89 SINUS PRESSURE: ICD-10-CM

## 2024-07-15 DIAGNOSIS — Z78.0 POST-MENOPAUSAL: ICD-10-CM

## 2024-07-15 DIAGNOSIS — I10 ESSENTIAL HYPERTENSION: Primary | ICD-10-CM

## 2024-07-15 PROCEDURE — 3078F DIAST BP <80 MM HG: CPT | Performed by: INTERNAL MEDICINE

## 2024-07-15 PROCEDURE — 1159F MED LIST DOCD IN RCRD: CPT | Performed by: INTERNAL MEDICINE

## 2024-07-15 PROCEDURE — G2211 COMPLEX E/M VISIT ADD ON: HCPCS | Performed by: INTERNAL MEDICINE

## 2024-07-15 PROCEDURE — 1160F RVW MEDS BY RX/DR IN RCRD: CPT | Performed by: INTERNAL MEDICINE

## 2024-07-15 PROCEDURE — 99214 OFFICE O/P EST MOD 30 MIN: CPT | Performed by: INTERNAL MEDICINE

## 2024-07-15 PROCEDURE — 3074F SYST BP LT 130 MM HG: CPT | Performed by: INTERNAL MEDICINE

## 2024-07-15 PROCEDURE — 1125F AMNT PAIN NOTED PAIN PRSNT: CPT | Performed by: INTERNAL MEDICINE

## 2024-07-15 NOTE — PROGRESS NOTES
"    Chief Complaint  Follow-up (3 month follow up.  Due for annual. ), Hypertension, Sinus Problem (Taking Flonase but doesn't feel like its working. Scheduled to see Dr. Hernandes's PA Andrey Klein 7/25/), and Back Pain    Subjective        Mally BO De La Cruz presents to [facility] PRIMARY CARE  Follow-up  Hypertension    Sinus Problem    Back Pain      See below.     Objective   Vital Signs:  /70 (BP Location: Left arm, Patient Position: Sitting, Cuff Size: Adult)   Pulse 65   Temp 96.8 °F (36 °C) (Temporal)   Ht 152.4 cm (60\")   Wt 72.1 kg (159 lb)   SpO2 98%   BMI 31.05 kg/m²   Estimated body mass index is 31.05 kg/m² as calculated from the following:    Height as of this encounter: 152.4 cm (60\").    Weight as of this encounter: 72.1 kg (159 lb).         Physical Exam  HENT:      Head: Normocephalic and atraumatic.   Eyes:      Conjunctiva/sclera: Conjunctivae normal.      Pupils: Pupils are equal, round, and reactive to light.   Cardiovascular:      Rate and Rhythm: Normal rate and regular rhythm.      Heart sounds: Normal heart sounds.   Pulmonary:      Effort: Pulmonary effort is normal. No respiratory distress.      Breath sounds: Normal breath sounds.   Musculoskeletal:         General: No swelling.   Skin:     General: Skin is warm and dry.      Findings: No rash.   Neurological:      General: No focal deficit present.      Mental Status: She is alert and oriented to person, place, and time.   Psychiatric:         Mood and Affect: Mood normal.         Behavior: Behavior normal.         Thought Content: Thought content normal.         Judgment: Judgment normal.      Comments: Super pleasant. We also talked a lot about her traveling.         Result Review :  Labs from 5/10:  1.  CMP fairly unremarkable.  2.  Hemoglobin A1c 5.9.  3.  TSH 1.930.  4.  Total cholesterol 169, HDL 56, LDL 96, triglycerides 92.  5.  CBC normal.    Labs from 4/24:  1.  Hepatitis C antibody " nonreactive.  2.  Vitamin D normal at 40.0.  3.  Vitamin B12 and folate normal.    CT of the head without contrast  IMPRESSION:  1. No acute intracranial process.  2. Old lacunar infarct in the anterior left lentiform nucleus versus  prominent perivascular space.  This report was signed and finalized on 5/31/2024 2:55 PM by Dr Abdias Nieves.    CT of the sinuses  IMPRESSION:  1. No air-fluid levels to indicate acute sinusitis, though there is  mucosal thickening and probable chronic sinusitis involving both  maxillary sinuses and a few ethmoid air cells. The frontal and sphenoid  sinuses are clear.  2. Undulation of the nasal septum with mild deviation of the anterior  septum to the left. There is a 10 mm focus of shawn bullosa within the  right middle turbinate.  This report was signed and finalized on 5/31/2024 2:57 PM by Dr. Trev Appiah MD.    Mammogram in April 2024 was showing lobular asymmetry of the left with no suspicious right abnormalities.  She had an ultrasound following that showed mildly complicated left breast cyst with recommendation to follow-up ultrasound in 6 months.    Colonoscopy in February 2024 with Dr. Palma:           Assessment and Plan   Diagnoses and all orders for this visit:    1. Essential hypertension (Primary)    2. Prediabetes    3. Other hyperlipidemia    4. Sinus pressure    5. Burning sensation of feet  -     EMG & Nerve Conduction Test; Future    6. Post-menopausal  -     DEXA Bone Density Axial; Future       Presents today for follow-up.    Her blood pressure is well-controlled at 122/70.  She had previously also been on losartan, which was too much and caused orthostasis.  Continue HCTZ monotherapy.    Hemoglobin A1c was 5.9 in May.  Monitor starches and carbohydrates.  Reassess hemoglobin A1c at her next appointment, which will have been 6 months.    Cholesterol studies look good on 20 mg of atorvastatin.  No problems with side effects or intolerance.    She has had  "longstanding problems with an odd sensation of what sounds to be her sinuses.  She does scribes it as a \"fuzzy feeling.\"  We trialed Flonase, which maybe has helped a bit.  She had a CT of her head as well as her sinuses.  CT of the sinus results as above.  We ultimately referred her to ENT.  She sees JULIAN Gallego on 7/25.  Highly likely to undergo nasopharyngoscopy.    She has had longstanding issues with burning in her feet.  She has not previously had a distinct reason to have a neuropathic issue.  She has previously trialed gabapentin and pregabalin with no help but problems with sedation.  Obtain EMG/NCV.  B12 was normal at 458 in April 2024.    She gets a follow-up left breast ultrasound done in October.    She had a normal Pap smear in August 2022.  She is in the ovarian cancer screening program through the [facility].    DEXA scan due in August; ordered today.  Her last was in August 2022 and was normal.    Colonoscopy due again in February 2029.     Sees Dr. Booth for dermatology.      Plan to have her back in November for her Medicare wellness.  Repeat hemoglobin A1c at that point in time.      Follow Up   Return in about 4 months (around 11/15/2024) for Medicare Wellness.  Patient was given instructions and counseling regarding her condition or for health maintenance advice. Please see specific information pulled into the AVS if appropriate.      KARYN Osorio DO       Electronically signed by RONNY Osorio DO, 07/15/24, 2:49 PM CDT.  "

## 2024-07-18 PROBLEM — R73.03 PREDIABETES: Status: ACTIVE | Noted: 2024-07-18

## 2024-07-25 ENCOUNTER — OFFICE VISIT (OUTPATIENT)
Dept: OTOLARYNGOLOGY | Facility: CLINIC | Age: 74
End: 2024-07-25
Payer: MEDICARE

## 2024-07-25 VITALS
TEMPERATURE: 97.5 F | HEART RATE: 68 BPM | WEIGHT: 161 LBS | SYSTOLIC BLOOD PRESSURE: 121 MMHG | DIASTOLIC BLOOD PRESSURE: 84 MMHG | BODY MASS INDEX: 31.44 KG/M2

## 2024-07-25 DIAGNOSIS — J34.89 CONCHA BULLOSA: Primary | ICD-10-CM

## 2024-07-25 NOTE — PATIENT INSTRUCTIONS
>NASAL STEROID use:  Using nasal steroids:  You will be prescribed one of the following nasal steroids: Flonase, Nasacort, Nasonex, Rhinocort, Qnasl, Zetonna  2 puffs each nostril 2 times daily  Start as soon as possible  If you are using Afrin for 3 days with the nasal steroid,  Use Afrin first and wait 10 minutes to allow the nose to open. Then administer nasal steroids.   >NASAL SALINE:  Use 2 puffs each nostril 4-6 times daily and more frequently if possible.  You can buy saline spray or you can make your own and use an old spray bottle to administer  Use a humidifier at bedside  Recipe for saline:  Water                                 1 quart  Salt (table)                        1 tablespoon  Gylcerin (or Tanika Syrup)    1 teaspoon  Sodium bicarbonate           1 teaspoon  Sprays or Catalina pots are recommended    Do not allow to stand for more than 24 hrs. Make new solution. There is no preservative in this solution.    Sinus irrigation and saline application can be enhanced with various over-the-counter products.  A WaterPik can be quite useful to irrigate, especially following sinus surgery.  Navage makes a product that irrigates the nose and some of the sinuses.  NeilMed makes a Sinu-Gator to irrigate the sinuses.  Neomed also has canned saline that we will come out under pressure.  A Catalina pot can also be helpful.  All of these products help keep the nose clear of debris.  Please use as directed on the instructions that come with the particular device.

## 2024-07-25 NOTE — PROGRESS NOTES
JULIAN Lance  [facility]  [address], [address]  [place] [address]  Fax [phone]  Phone [phone]      Visit Type: NEW PATIENT   Chief Complaint   Patient presents with    CT head/sinus results    Nasal Congestion    Sinus Problem           HPI  Mally De L aCruz is a 74 y.o. female who presents for evaluation of possible sinus complaints.  She admits abnormal feeling in head, states hard to describe but is like foggy headed/ lightheaded or fullness in head and unsteadiness. Symptoms have been present for about 5 years now. Non-localized. She had recent sinus that showed a shawn bullosa to the right middle turbinate and some mild mucosal thickening of the maxillary and ethmoid sinuses. She denies recurrent sinus infection. She states she does have some drainage in her throat at night and first thing in the morning but denies notable congestion or drainage through out the day normally.   She states she mostly feels when she stands up or is up walking. States turning quickly can cause. She denies known improving factors. Denies notable ear complaints.         Past Medical History:   Diagnosis Date    Diverticulosis     GERD (gastroesophageal reflux disease)     History of adenomatous polyp of colon     History of basal cell carcinoma (BCC)     Left calf, nose, left forearm, right side of chest    History of colon polyps     Hyperlipidemia     Hypertension     Schatzki's ring     Spinal headache        Past Surgical History:   Procedure Laterality Date    ADENOIDECTOMY      BASAL CELL CARCINOMA EXCISION      BLEPHAROPLASTY      CATARACT EXTRACTION Bilateral     COLONOSCOPY N/A 12/03/2018    Diverticulosis in the entire examined colon; One 5mm tubular adenonatous polyp in the cecum; The examination was otherwise normal on direct and retroflexion views; Repeat 5 years    COLONOSCOPY  11/14/2013    One 6mm tubular  adenomatous polyp in the transverse colon; Diverticulosis in the entire examined colon; Repeat 5 years    COLONOSCOPY  12/18/2009    One 5mm hyperplastic polyp in the transverse colon; Diverticulosis in the left colon; Repeat 4 years    COLONOSCOPY  01/31/2003    Diverticulosis; One 6mm polyp in the sigmoid colon-path shows benign large bowel mucosa demonstrating intramucosal mature lymphoid aggregate and mild non-specific reactive changes; Small internal hemorrhoids    COLONOSCOPY N/A 2/5/2024    Procedure: COLONOSCOPY WITH ANESTHESIA;  Surgeon: Shelly Palma MD;  Location: [facility] ENDOSCOPY;  Service: Gastroenterology;  Laterality: N/A;  preop; hx of polyps  postop diverticulosis ; polyps   PCP Carlos Sanford    ENDOSCOPY  12/16/2009    HH; Schatzki's ring-Dilated to 20mm    ENDOSCOPY  02/06/2003    Normal endoscopy    REDUCTION MAMMAPLASTY Bilateral 2015    REPLACEMENT TOTAL KNEE Left 2018    RETINAL DETACHMENT REPAIR Bilateral     TONSILLECTOMY      TUBAL ABDOMINAL LIGATION         Family History: Her family history includes No Known Problems in her brother, father, maternal grandfather, maternal grandmother, mother, paternal grandfather, paternal grandmother, and sister. She was adopted.     Social History: She  reports that she has never smoked. She has never used smokeless tobacco. She reports current alcohol use. She reports that she does not use drugs.    Home Medications:  Fiber, Melatonin, Multiple Vitamins-Minerals, Polyethyl Glycol-Propyl Glycol, Vitamin D, acetaminophen, ascorbic acid, atorvastatin, docusate sodium, fluticasone, hydroCHLOROthiazide, omeprazole, and valACYclovir    Allergies:  She is allergic to nsaids, sulfa antibiotics, and ace inhibitors.       Vital Signs:   Temp:  [97.5 °F (36.4 °C)] 97.5 °F (36.4 °C)  Heart Rate:  [68] 68  BP: (121)/(84) 121/84  ENT Physical Exam  Constitutional  Appearance: patient appears well-developed, well-nourished and well-groomed,  Communication/Voice:  communication appropriate for developmental age; vocal quality normal;  Head and Face  Appearance: head appears normal, face appears normal and face appears atraumatic;  Palpation: facial palpation normal;  Salivary: glands normal;  Ear  Hearing: intact;  Auricles: bilateral auricles normal;  External Mastoids: bilateral external mastoids normal;  Ear Canals: bilateral ear canals normal;  Tympanic Membranes: bilateral tympanic membranes normal;  Nose  External Nose: nares patent bilaterally; external nose normal;  Internal Nose: nasal mucosa normal; nasal septal deviation present; deviation is to the left, bilateral inferior turbinates with hypertrophy;  Oral Cavity/Oropharynx  Lips: normal;  Teeth: normal;  Gums: gingiva normal;  Tongue: normal;  Oral mucosa: normal;  Hard palate: normal;  Soft palate: normal;  Tonsils: bilateral tonsils absent,  Base of Tongue: normal;  Posterior pharyngeal wall: normal;  Neck  Neck: neck normal; neck palpation normal;  Respiratory  Inspection: breathing unlabored; normal breathing rate;  Cardiovascular  Inspection: extremities are warm and well perfused;  Neurovestibular  Mental Status: alert and oriented;           Result Review       RESULTS REVIEW    I have reviewed the patients old records in the chart.   The following results/records were reviewed:     CT Sinus Without Contrast (05/31/2024 14:43)          Assessment & Plan  Shawn bullosa                CT sinus shows notable shawn bullosa of the right middle turbinate some mucosal thickening in the maxillary sinuses and ethmoid sinuses, left septal deviation    I did offer nasal endoscopy today but she declines.  Her main concern is the reported lightheaded or or full headed feeling that she is experiencing and this does not appear to be ENT related at this time.  I did a basic vestibular exam on her which was perfectly normal today.  Her descriptions are more concerning for possible neurological orthostatic issues.   Advise her to follow-up PCP discuss this further.  For her mild sinus congestion and drainage I did advise some conservative measures including Flonase and oral antihistamine which she is agreeable to.  I will be happy to follow her for these complaints and advise her she can return for recheck in 3 to 4 months.  If new symptoms develop she will call with any questions or concerns.    Treatment options discussed.  Surgical considerations including septoplasty and turbinate reduction with shawn bullosa resection could be considered but she declined surgical interventions at this time.  States that this is not causing her full or lightheaded sensations she does not feel surgery is needed at this point.  She is agreeable to conservative measures.    Medical and surgical options were discussed including observation, continued medical management, medication modification, and surgical management. Risks, benefits and alternatives were discussed and questions were answered. After considering the options, the patient decided to proceed with medication modification.  Saline nasal spray or saline gel to nose three times a day and as needed. Use a bedside humidifier at night. Place Vasoline in nose in the morning and at night.  Place Vasoline in nose in the morning and at night on the center part of the nose (septum) at least 15 minutes prior to the nasal spray. When using the nasal spray, aim towards the outer corner of the eye.   For the best response, use your nasal sprays every day without skipping doses. It may take several weeks before the full effect is acheived.   GENERAL ALLERGY AVOIDANCE: Regular vacuum cleaning is  recommended  to prevent accumulation of allergens. Use adequate filtration, including double thickness bags or HEPA filters on the  outlet. Use air-conditioning where possible. Change central air filters with an allergy rated filter on a regular basis. Install car pollen filters where possible.    Start flonase  Oral antihistamine  Increase water intake    Call with questions or concerns    Return in about 3 months (around 10/25/2024) for Recheck sinus complaints.        Electronically signed by JULIAN Lance 07/25/24 9:38 AM CDT.

## 2024-08-06 ENCOUNTER — HOSPITAL ENCOUNTER (OUTPATIENT)
Dept: NEUROLOGY | Facility: HOSPITAL | Age: 74
Discharge: HOME | End: 2024-08-06
Admitting: INTERNAL MEDICINE
Payer: MEDICARE

## 2024-08-06 DIAGNOSIS — R20.8 BURNING SENSATION OF FEET: ICD-10-CM

## 2024-08-06 PROCEDURE — 95911 NRV CNDJ TEST 9-10 STUDIES: CPT

## 2024-08-06 PROCEDURE — 95886 MUSC TEST DONE W/N TEST COMP: CPT

## 2024-08-09 DIAGNOSIS — R20.8 BURNING SENSATION OF FEET: Primary | ICD-10-CM

## 2024-08-22 ENCOUNTER — TELEPHONE (OUTPATIENT)
Dept: INTERNAL MEDICINE | Facility: CLINIC | Age: 74
End: 2024-08-22

## 2024-08-27 ENCOUNTER — APPOINTMENT (OUTPATIENT)
Dept: NEUROLOGY | Facility: HOSPITAL | Age: 74
End: 2024-08-27
Payer: MEDICARE

## 2024-08-27 ENCOUNTER — HOSPITAL ENCOUNTER (OUTPATIENT)
Dept: BONE DENSITY | Facility: HOSPITAL | Age: 74
Discharge: HOME | End: 2024-08-27
Admitting: INTERNAL MEDICINE
Payer: MEDICARE

## 2024-08-27 ENCOUNTER — OFFICE VISIT (OUTPATIENT)
Dept: OBSTETRICS AND GYNECOLOGY | Age: 74
End: 2024-08-27
Payer: MEDICARE

## 2024-08-27 VITALS
DIASTOLIC BLOOD PRESSURE: 80 MMHG | SYSTOLIC BLOOD PRESSURE: 142 MMHG | WEIGHT: 161 LBS | BODY MASS INDEX: 31.61 KG/M2 | HEIGHT: 60 IN

## 2024-08-27 DIAGNOSIS — N81.6 RECTOCELE: ICD-10-CM

## 2024-08-27 DIAGNOSIS — N81.10 FEMALE CYSTOCELE: ICD-10-CM

## 2024-08-27 DIAGNOSIS — N39.41 URGE INCONTINENCE: ICD-10-CM

## 2024-08-27 DIAGNOSIS — Z98.890 HISTORY OF CRYOSURGERY: ICD-10-CM

## 2024-08-27 DIAGNOSIS — N60.02 SOLITARY CYST OF LEFT BREAST: ICD-10-CM

## 2024-08-27 DIAGNOSIS — Z01.411 ENCOUNTER FOR GYNECOLOGICAL EXAMINATION WITH ABNORMAL FINDING: Primary | ICD-10-CM

## 2024-08-27 DIAGNOSIS — Z78.0 POST-MENOPAUSAL: ICD-10-CM

## 2024-08-27 PROCEDURE — 77080 DXA BONE DENSITY AXIAL: CPT

## 2024-08-27 RX ORDER — CETIRIZINE HYDROCHLORIDE 10 MG/1
10 TABLET ORAL DAILY
COMMUNITY

## 2024-08-27 NOTE — PROGRESS NOTES
Subjective   Chief Complaint   Patient presents with    Annual Exam     Pt here today as new pt for annual exam. Pt last mammo 24. Pt goes back in October. Bone dx today. Pt up to date on colonoscopy.      Mally De La Cruz is a 74 y.o. year old  menopausal female presenting to be seen for her annual exam.  Overall, the patient reports to be feeling fine, with some age-related aches and pains.  The patient denies any vaginal bleeding in the past 12 months  Hot flashes and night sweats are not a significant problem.    She is not sexually active.      Patient reports regular self breast exams: yes  Impression from mammogram in April:  IMPRESSION:  1. A 7-8 mm lobular asymmetry on the left needs further evaluation with  ultrasound. BI-RADS 0.  2. No suspicious right breast abnormality.  3. Computer aided detection was utilized. 3-D Tomosynthesis was  performed.    She exercises regularly: yes.  Walking at least every other day  She has noticed changes in height: no.    No Additional Complaints Reported    The following portions of the patient's history were reviewed and updated as appropriate:problem list, current medications, allergies, past family history, past medical history, past social history, and past surgical history.  Social History    Tobacco Use      Smoking status: Never      Smokeless tobacco: Never    Review of Systems   Constitutional:  Negative for activity change and unexpected weight change.   HENT:  Negative for congestion.    Respiratory:  Negative for shortness of breath.    Cardiovascular:  Negative for chest pain.   Gastrointestinal:  Positive for constipation (manages wtih benefiber and colace). Negative for abdominal pain, blood in stool and diarrhea.        Colonoscopy 2024 with a polyp, going back in 5 years   Endocrine: Negative for cold intolerance and heat intolerance.   Genitourinary:  Positive for enuresis (only if too full and she gets near running water). Negative  "for difficulty urinating, frequency, pelvic pain, urgency, vaginal bleeding, vaginal discharge and vaginal pain.        No longer sexually active   Musculoskeletal:  Positive for arthralgias, back pain, neck pain and neck stiffness.   Skin:  Negative for rash.   Neurological:  Negative for dizziness and headaches.   Psychiatric/Behavioral:  Positive for sleep disturbance (attributes to aches and pains). Negative for dysphoric mood. The patient is not nervous/anxious.        Objective   /80   Ht 152.4 cm (60\")   Wt 73 kg (161 lb)   BMI 31.44 kg/m²   Physical Exam  Vitals and nursing note reviewed. Exam conducted with a chaperone present.   Constitutional:       General: She is not in acute distress.     Appearance: She is well-developed.   HENT:      Head: Normocephalic and atraumatic.   Neck:      Thyroid: No thyromegaly.   Cardiovascular:      Rate and Rhythm: Normal rate and regular rhythm.      Heart sounds: No murmur heard.  Pulmonary:      Effort: Pulmonary effort is normal.      Breath sounds: Normal breath sounds.   Chest:   Breasts:     Right: No inverted nipple or mass.      Left: No inverted nipple or mass.   Abdominal:      General: There is no distension.      Palpations: Abdomen is soft.      Tenderness: There is no abdominal tenderness.   Genitourinary:     General: Normal vulva.      Labia:         Right: No tenderness or lesion.         Left: No tenderness or lesion.       Vagina: Normal. No vaginal discharge or bleeding.      Cervix: No cervical motion tenderness or discharge.      Adnexa:         Right: No tenderness or fullness.          Left: No tenderness or fullness.        Rectum: No external hemorrhoid or internal hemorrhoid. Normal anal tone.      Comments: Labia normal, no lesions noted.  Urethral meatus unremarkable, no prolapse of mucosa.  Anus/perineum normal.  No uterine prolapse.  Grade 2 cystocele, grade 2 rectocele  Musculoskeletal:         General: Normal range of motion. "      Cervical back: Normal range of motion and neck supple.   Skin:     General: Skin is warm and dry.   Neurological:      Mental Status: She is alert and oriented to person, place, and time.   Psychiatric:         Mood and Affect: Mood normal.         Behavior: Behavior normal.         Thought Content: Thought content normal.         Judgment: Judgment normal.            Assessment & Plan  Diagnoses and all orders for this visit:    1. Encounter for gynecological examination with abnormal finding (Primary): Exam remarkable for asymptomatic pelvic organ prolapse.  She reports regular self breast exam.  Patient had a routine screening mammogram in April, which required a follow-up ultrasound.  A benign cyst was identified and she is already scheduled to follow-up again in October.  Bone density testing was performed today.  Patient's colonoscopy is up-to-date; this was done earlier this year and she is due to return again in 5 years since she had 2 polyps removed.  Routine screening labs with PCP    2. History of cryosurgery: Discussed the patient's Pap history.  She did have 1 abnormal in the past which was treated by Dr. uLke with cryotherapy.  All of her Paps since that time have been normal.  Patient advised that she no longer needs Pap smear surveillance, and she is agreeable with this plan  Comments:  in late '70's.  All PAP's after that were normal    3. Female cystocele: Asymptomatic.  Patient reports good bladder emptying and she has not had any UTIs in the recent past  Comments:  grade II (2024)    4. Rectocele: Asymptomatic.  No difficulty passing stool  Comments:  grade II (2024)    5. Urge incontinence: Rare  Comments:  Very rare.  Only occurs if her bladder is full and she is near running water    6. Solitary cyst of left breast: Already scheduled for follow-up imaging in October  Comments:  Found on mammogram and follow-up ultrasound in April 2024    Patient will continue annual exams so that we can  assess her prolapse and see if it is getting any worse    This note was electronically signed.    Ghazala Nieves MD  8/27/2024  13:56 CDT

## 2024-09-27 RX ORDER — ATORVASTATIN CALCIUM 20 MG/1
40 TABLET, FILM COATED ORAL DAILY
Qty: 180 TABLET | Refills: 3 | Status: SHIPPED | OUTPATIENT
Start: 2024-09-27

## 2024-09-27 RX ORDER — HYDROCHLOROTHIAZIDE 12.5 MG/1
12.5 TABLET ORAL DAILY
Qty: 90 TABLET | Refills: 3 | Status: SHIPPED | OUTPATIENT
Start: 2024-09-27

## 2024-10-14 ENCOUNTER — HOSPITAL ENCOUNTER (OUTPATIENT)
Dept: ULTRASOUND IMAGING | Facility: HOSPITAL | Age: 74
Discharge: HOME | End: 2024-10-14
Admitting: OBSTETRICS & GYNECOLOGY
Payer: MEDICARE

## 2024-10-14 DIAGNOSIS — N60.02 SOLITARY CYST OF LEFT BREAST: ICD-10-CM

## 2024-10-14 PROCEDURE — 76642 ULTRASOUND BREAST LIMITED: CPT

## 2024-10-15 DIAGNOSIS — N60.02 SOLITARY CYST OF LEFT BREAST: Primary | ICD-10-CM

## 2024-10-29 ENCOUNTER — TELEPHONE (OUTPATIENT)
Dept: OBSTETRICS AND GYNECOLOGY | Age: 74
End: 2024-10-29
Payer: MEDICARE

## 2024-10-30 DIAGNOSIS — N60.02 SOLITARY CYST OF LEFT BREAST: Primary | ICD-10-CM

## 2024-11-04 ENCOUNTER — LAB (OUTPATIENT)
Dept: INTERNAL MEDICINE | Facility: CLINIC | Age: 74
End: 2024-11-04
Payer: MEDICARE

## 2024-11-04 DIAGNOSIS — E55.9 VITAMIN D DEFICIENCY: Primary | ICD-10-CM

## 2024-11-04 DIAGNOSIS — I10 ESSENTIAL HYPERTENSION: ICD-10-CM

## 2024-11-04 DIAGNOSIS — E78.49 OTHER HYPERLIPIDEMIA: ICD-10-CM

## 2024-11-04 DIAGNOSIS — R73.03 PREDIABETES: ICD-10-CM

## 2024-11-05 LAB
25(OH)D3+25(OH)D2 SERPL-MCNC: 38.3 NG/ML (ref 30–100)
ALBUMIN SERPL-MCNC: 4.3 G/DL (ref 3.5–5.2)
ALBUMIN/GLOB SERPL: 2 G/DL
ALP SERPL-CCNC: 63 U/L (ref 39–117)
ALT SERPL-CCNC: 17 U/L (ref 1–33)
APPEARANCE UR: CLEAR
AST SERPL-CCNC: 21 U/L (ref 1–32)
BACTERIA #/AREA URNS HPF: NORMAL /HPF
BASOPHILS # BLD AUTO: 0.02 10*3/MM3 (ref 0–0.2)
BASOPHILS NFR BLD AUTO: 0.4 % (ref 0–1.5)
BILIRUB SERPL-MCNC: 0.6 MG/DL (ref 0–1.2)
BILIRUB UR QL STRIP: NEGATIVE
BUN SERPL-MCNC: 16 MG/DL (ref 8–23)
BUN/CREAT SERPL: 19.8 (ref 7–25)
CALCIUM SERPL-MCNC: 9.6 MG/DL (ref 8.6–10.5)
CASTS URNS MICRO: NORMAL
CHLORIDE SERPL-SCNC: 103 MMOL/L (ref 98–107)
CHOLEST SERPL-MCNC: 164 MG/DL (ref 0–200)
CO2 SERPL-SCNC: 27.5 MMOL/L (ref 22–29)
COLOR UR: YELLOW
CREAT SERPL-MCNC: 0.81 MG/DL (ref 0.57–1)
EGFRCR SERPLBLD CKD-EPI 2021: 75.8 ML/MIN/1.73
EOSINOPHIL # BLD AUTO: 0.04 10*3/MM3 (ref 0–0.4)
EOSINOPHIL NFR BLD AUTO: 0.8 % (ref 0.3–6.2)
EPI CELLS #/AREA URNS HPF: NORMAL /HPF
ERYTHROCYTE [DISTWIDTH] IN BLOOD BY AUTOMATED COUNT: 12.6 % (ref 12.3–15.4)
GLOBULIN SER CALC-MCNC: 2.2 GM/DL
GLUCOSE SERPL-MCNC: 96 MG/DL (ref 65–99)
GLUCOSE UR QL STRIP: NEGATIVE
HBA1C MFR BLD: 5.7 % (ref 4.8–5.6)
HCT VFR BLD AUTO: 39.6 % (ref 34–46.6)
HDLC SERPL-MCNC: 53 MG/DL (ref 40–60)
HGB BLD-MCNC: 13.4 G/DL (ref 12–15.9)
HGB UR QL STRIP: NEGATIVE
IMM GRANULOCYTES # BLD AUTO: 0.01 10*3/MM3 (ref 0–0.05)
IMM GRANULOCYTES NFR BLD AUTO: 0.2 % (ref 0–0.5)
KETONES UR QL STRIP: NEGATIVE
LDLC SERPL CALC-MCNC: 95 MG/DL (ref 0–100)
LEUKOCYTE ESTERASE UR QL STRIP: NEGATIVE
LYMPHOCYTES # BLD AUTO: 1.92 10*3/MM3 (ref 0.7–3.1)
LYMPHOCYTES NFR BLD AUTO: 37.3 % (ref 19.6–45.3)
MCH RBC QN AUTO: 32.9 PG (ref 26.6–33)
MCHC RBC AUTO-ENTMCNC: 33.8 G/DL (ref 31.5–35.7)
MCV RBC AUTO: 97.3 FL (ref 79–97)
MONOCYTES # BLD AUTO: 0.38 10*3/MM3 (ref 0.1–0.9)
MONOCYTES NFR BLD AUTO: 7.4 % (ref 5–12)
NEUTROPHILS # BLD AUTO: 2.78 10*3/MM3 (ref 1.7–7)
NEUTROPHILS NFR BLD AUTO: 53.9 % (ref 42.7–76)
NITRITE UR QL STRIP: NEGATIVE
NRBC BLD AUTO-RTO: 0 /100 WBC (ref 0–0.2)
PH UR STRIP: 6.5 [PH] (ref 5–8)
PLATELET # BLD AUTO: 178 10*3/MM3 (ref 140–450)
POTASSIUM SERPL-SCNC: 4.4 MMOL/L (ref 3.5–5.2)
PROT SERPL-MCNC: 6.5 G/DL (ref 6–8.5)
PROT UR QL STRIP: NEGATIVE
RBC # BLD AUTO: 4.07 10*6/MM3 (ref 3.77–5.28)
RBC #/AREA URNS HPF: NORMAL /HPF
SODIUM SERPL-SCNC: 142 MMOL/L (ref 136–145)
SP GR UR STRIP: 1.01 (ref 1–1.03)
TRIGL SERPL-MCNC: 84 MG/DL (ref 0–150)
TSH SERPL DL<=0.005 MIU/L-ACNC: 2.25 UIU/ML (ref 0.27–4.2)
UROBILINOGEN UR STRIP-MCNC: NORMAL MG/DL
VLDLC SERPL CALC-MCNC: 16 MG/DL (ref 5–40)
WBC # BLD AUTO: 5.15 10*3/MM3 (ref 3.4–10.8)
WBC #/AREA URNS HPF: NORMAL /HPF

## 2024-11-06 ENCOUNTER — OFFICE VISIT (OUTPATIENT)
Dept: OTOLARYNGOLOGY | Facility: CLINIC | Age: 74
End: 2024-11-06
Payer: MEDICARE

## 2024-11-06 VITALS
BODY MASS INDEX: 30.92 KG/M2 | HEART RATE: 72 BPM | TEMPERATURE: 97.9 F | DIASTOLIC BLOOD PRESSURE: 90 MMHG | WEIGHT: 157.5 LBS | SYSTOLIC BLOOD PRESSURE: 122 MMHG | HEIGHT: 60 IN

## 2024-11-06 DIAGNOSIS — R42 LIGHTHEADEDNESS: ICD-10-CM

## 2024-11-06 DIAGNOSIS — J34.89 CONCHA BULLOSA: Primary | ICD-10-CM

## 2024-11-06 DIAGNOSIS — R09.82 POST-NASAL DRIP: ICD-10-CM

## 2024-11-06 DIAGNOSIS — R26.89 IMBALANCE: ICD-10-CM

## 2024-11-06 NOTE — PROGRESS NOTES
JULIAN Lance  [facility]  [address], [address]  [place] [facility] [address]  Fax [phone]  Phone [phone]      Visit Type: FOLLOW UP   Chief Complaint   Patient presents with    Recheck sinuses     Congestion in the morning           HISTORY OBTAINED FROM: patient  HPI  Mally De La Cruz is a 74 y.o. female who presents for evaluation, recheck sinus complaints and foggy/lightheadedness.  Symptoms have been present for about 5 years now.  Nonlocalized.  She admits a sensation of foggy headed or lightheadedness, fullness feeling in her head accompanied by unsteadiness and imbalance, episodic, mostly notable when she stands up or turns quickly quickly and with walking.  No known improving factors.  States episodes last for a few seconds to minutes at a time.    She also reports some sinus drainage, mostly feels that it is postnasal in her throat at night and first in the morning.  Overall describes as fairly mild.  She denies known recurrent sinus infections.    Since last visit she reports she did try Flonase but did stop as she felt it was causing a sore throat.  She denies significant change in symptoms.  States she is still having intermittent episodes of imbalance and lightheadedness.  She did have a nerve conduction study from PCP but states that it was normal, she does have follow-up with neurology as well.    Past Medical History:   Diagnosis Date    Diverticulosis     GERD (gastroesophageal reflux disease)     History of adenomatous polyp of colon     History of basal cell carcinoma (BCC)     Left calf, nose, left forearm, right side of chest    History of colon polyps     Hyperlipidemia     Hypertension     Schatzki's ring     Spinal headache        Past Surgical History:   Procedure Laterality Date    ADENOIDECTOMY      BASAL CELL CARCINOMA EXCISION      BLEPHAROPLASTY      CATARACT EXTRACTION Bilateral      COLONOSCOPY N/A 12/03/2018    Diverticulosis in the entire examined colon; One 5mm tubular adenonatous polyp in the cecum; The examination was otherwise normal on direct and retroflexion views; Repeat 5 years    COLONOSCOPY  11/14/2013    One 6mm tubular adenomatous polyp in the transverse colon; Diverticulosis in the entire examined colon; Repeat 5 years    COLONOSCOPY  12/18/2009    One 5mm hyperplastic polyp in the transverse colon; Diverticulosis in the left colon; Repeat 4 years    COLONOSCOPY  01/31/2003    Diverticulosis; One 6mm polyp in the sigmoid colon-path shows benign large bowel mucosa demonstrating intramucosal mature lymphoid aggregate and mild non-specific reactive changes; Small internal hemorrhoids    COLONOSCOPY N/A 02/05/2024    Procedure: COLONOSCOPY WITH ANESTHESIA;  Surgeon: Shelly Palma MD;  Location: [facility] ENDOSCOPY;  Service: Gastroenterology;  Laterality: N/A;  preop; hx of polyps  postop diverticulosis ; polyps   PCP Carlos Sanford    ENDOSCOPY  12/16/2009    HH; Schatzki's ring-Dilated to 20mm    ENDOSCOPY  02/06/2003    Normal endoscopy    REDUCTION MAMMAPLASTY Bilateral 2015    REPLACEMENT TOTAL KNEE Left 2018    RETINAL LASER PROCEDURE Bilateral     retinal tear bilateral    TONSILLECTOMY      TUBAL ABDOMINAL LIGATION         Family History: Her family history includes No Known Problems in her brother, father, maternal grandfather, maternal grandmother, mother, paternal grandfather, paternal grandmother, and sister. She was adopted.     Social History: She  reports that she has never smoked. She has never used smokeless tobacco. She reports current alcohol use. She reports that she does not use drugs.    Home Medications:  Melatonin, Multiple Vitamins-Minerals, Polyethyl Glycol-Propyl Glycol, Vitamin D, Wheat Dextrin, acetaminophen, ascorbic acid, atorvastatin, cetirizine, docusate sodium, hydroCHLOROthiazide, omeprazole, and valACYclovir    Allergies:  She is allergic to nsaids,  sulfa antibiotics, and ace inhibitors.       Vital Signs:   Temp:  [97.9 °F (36.6 °C)] 97.9 °F (36.6 °C)  Heart Rate:  [72] 72  BP: (122)/(90) 122/90  ENT Physical Exam  Constitutional  Appearance: patient appears well-developed, well-nourished and well-groomed,  Communication/Voice: communication appropriate for developmental age; vocal quality normal;  Head and Face  Appearance: head appears normal, face appears normal and face appears atraumatic;  Palpation: facial palpation normal;  Salivary: glands normal;  Ear  Hearing: intact;  Auricles: bilateral auricles normal;  External Mastoids: bilateral external mastoids normal;  Ear Canals: bilateral ear canals normal;  Tympanic Membranes: bilateral tympanic membranes normal;  Nose  External Nose: nares patent bilaterally; external nose normal;  Internal Nose: nasal mucosa normal; nasal septal deviation present; deviation is to the left, bilateral inferior turbinates with hypertrophy;  Oral Cavity/Oropharynx  Lips: normal;  Teeth: normal;  Gums: gingiva normal;  Tongue: normal;  Oral mucosa: normal;  Hard palate: normal;  Soft palate: normal;  Tonsils: bilateral tonsils absent,  Base of Tongue: normal;  Posterior pharyngeal wall: normal;  Neck  Neck: neck normal; neck palpation normal;  Respiratory  Inspection: breathing unlabored; normal breathing rate;  Cardiovascular  Inspection: extremities are warm and well perfused;  Neurovestibular  Mental Status: alert and oriented;           Result Review       RESULTS REVIEW    I have reviewed the patients old records in the chart.         Assessment & Plan  Shawn bullosa    Lightheadedness    Imbalance    Post-nasal drip                Patient symptoms ongoing.  I still have low clinical suspicion for vestibular or sinus problems causing this but I did discuss and offer Seamus-Hallpike testing but she declines at this time.   I again discussed consideration of shawn bullosa repair and possible septoplasty but she is not  interested in any surgical interventions at this time.   I really do not feel this would help her overall symptoms in general.  I also again discussed nasal endoscopy, but she declines again today.  I did advise continue conservative measures for now and routine follow-up with her but she states states she wishes to discuss symptoms with neurology first and will follow-up or call me as needed.    I do feel she could consider gait or balance therapy, states she will consider but declines at this time.  I did advise that she could continue continue home vestibular exercises to see if this helps with general balance as well.    Call questions or concerns    No follow-ups on file.        Electronically signed by JULIAN Lance 11/06/24 4:26 PM CST.

## 2024-11-11 ENCOUNTER — OFFICE VISIT (OUTPATIENT)
Dept: INTERNAL MEDICINE | Facility: CLINIC | Age: 74
End: 2024-11-11
Payer: MEDICARE

## 2024-11-11 VITALS
HEART RATE: 73 BPM | HEIGHT: 60 IN | SYSTOLIC BLOOD PRESSURE: 140 MMHG | WEIGHT: 163 LBS | TEMPERATURE: 96.9 F | BODY MASS INDEX: 32 KG/M2 | OXYGEN SATURATION: 98 % | DIASTOLIC BLOOD PRESSURE: 78 MMHG

## 2024-11-11 DIAGNOSIS — I10 ESSENTIAL HYPERTENSION: ICD-10-CM

## 2024-11-11 DIAGNOSIS — R20.8 BURNING SENSATION OF FEET: ICD-10-CM

## 2024-11-11 DIAGNOSIS — E78.49 OTHER HYPERLIPIDEMIA: ICD-10-CM

## 2024-11-11 DIAGNOSIS — Z00.00 ENCOUNTER FOR SUBSEQUENT ANNUAL WELLNESS VISIT IN MEDICARE PATIENT: Primary | ICD-10-CM

## 2024-11-11 DIAGNOSIS — R73.03 PREDIABETES: ICD-10-CM

## 2024-11-11 PROCEDURE — 99214 OFFICE O/P EST MOD 30 MIN: CPT | Performed by: INTERNAL MEDICINE

## 2024-11-11 PROCEDURE — 3078F DIAST BP <80 MM HG: CPT | Performed by: INTERNAL MEDICINE

## 2024-11-11 PROCEDURE — 1125F AMNT PAIN NOTED PAIN PRSNT: CPT | Performed by: INTERNAL MEDICINE

## 2024-11-11 PROCEDURE — 3077F SYST BP >= 140 MM HG: CPT | Performed by: INTERNAL MEDICINE

## 2024-11-11 PROCEDURE — G0439 PPPS, SUBSEQ VISIT: HCPCS | Performed by: INTERNAL MEDICINE

## 2024-11-11 PROCEDURE — 1170F FXNL STATUS ASSESSED: CPT | Performed by: INTERNAL MEDICINE

## 2024-11-11 NOTE — PROGRESS NOTES
Subjective   The ABCs of the Annual Wellness Visit  Medicare Wellness Visit      Mally De La Cruz is a 74 y.o. patient who presents for a Medicare Wellness Visit.    The following portions of the patient's history were reviewed and   updated as appropriate: allergies, current medications, past family history, past medical history, past social history, past surgical history, and problem list.    Compared to one year ago, the patient's physical   health is the same.  Compared to one year ago, the patient's mental   health is the same.    Recent Hospitalizations:  She was not admitted to the hospital during the last year.     Current Medical Providers:  Patient Care Team:  RONNY Osorio DO as PCP - General (Internal Medicine)  Shelly Palma MD as Consulting Physician (Gastroenterology)    Outpatient Medications Prior to Visit   Medication Sig Dispense Refill    acetaminophen (TYLENOL) 500 MG tablet Take 1 tablet by mouth As Needed.      ascorbic acid (VITAMIN C) 1000 MG tablet Take 1 tablet by mouth Daily.      atorvastatin (LIPITOR) 20 MG tablet Take 2 tablets by mouth Daily. 180 tablet 3    cetirizine (zyrTEC) 10 MG tablet Take 1 tablet by mouth Daily. Takes half      Cholecalciferol (VITAMIN D) 2000 units capsule Take 1 capsule by mouth Daily.      docusate sodium (COLACE) 100 MG capsule Take 1 capsule by mouth As Needed.      hydroCHLOROthiazide 12.5 MG tablet Take 1 tablet by mouth Daily. 90 tablet 3    Melatonin 5 MG capsule Take  by mouth.      Multiple Vitamins-Minerals (PRESERVISION AREDS 2+MULTI VIT PO) Take 1 tablet by mouth Daily.      omeprazole (priLOSEC) 20 MG capsule Take 1 capsule by mouth Daily.      Polyethyl Glycol-Propyl Glycol (SYSTANE OP) Apply  to eye(s) as directed by provider.      valACYclovir (VALTREX) 500 MG tablet Take 1 tablet by mouth Daily.      Wheat Dextrin (BENEFIBER PO) Take  by mouth.       No facility-administered medications prior to visit.     No opioid  "medication identified on active medication list. I have reviewed chart for other potential  high risk medication/s and harmful drug interactions in the elderly.      Aspirin is not on active medication list.  Aspirin use is not indicated based on review of current medical condition/s. Risk of harm outweighs potential benefits.  .    Patient Active Problem List   Diagnosis    Hx of adenomatous colonic polyps    Essential hypertension    Other hyperlipidemia    Burning sensation of feet    Prediabetes     Advance Care Planning Advance Directive is on file.  ACP discussion was held with the patient during this visit. Patient has an advance directive in EMR which is still valid.             Objective   Vitals:    24 09   BP: 140/78   BP Location: Left arm   Patient Position: Sitting   Cuff Size: Adult   Pulse: 73   Temp: 96.9 °F (36.1 °C)   TempSrc: Temporal   SpO2: 98%   Weight: 73.9 kg (163 lb)   Height: 152.4 cm (60\")       Estimated body mass index is 31.83 kg/m² as calculated from the following:    Height as of this encounter: 152.4 cm (60\").    Weight as of this encounter: 73.9 kg (163 lb).            Does the patient have evidence of cognitive impairment? No  Lab Results   Component Value Date    CHLPL 164 2024    TRIG 84 2024    HDL 53 2024    LDL 95 2024    VLDL 16 2024    HGBA1C 5.70 (H) 2024                                                                                                Health  Risk Assessment    Smoking Status:  Social History     Tobacco Use   Smoking Status Never   Smokeless Tobacco Never     Alcohol Consumption:  Social History     Substance and Sexual Activity   Alcohol Use Yes    Comment: Socially       Fall Risk Screen  STEADI Fall Risk Assessment was completed, and patient is at LOW risk for falls.Assessment completed on:2024    Depression Screenin/11/2024     9:55 AM   PHQ-2/PHQ-9 Depression Screening   Little interest or " pleasure in doing things Not at all   Feeling down, depressed, or hopeless Several days   How difficult have these problems made it for you to do your work, take care of things at home, or get along with other people? Not difficult at all     Health Habits and Functional and Cognitive Screenin/11/2024     9:54 AM   Functional & Cognitive Status   Do you have difficulty preparing food and eating? No   Do you have difficulty bathing yourself, getting dressed or grooming yourself? No   Do you have difficulty using the toilet? No   Do you have difficulty moving around from place to place? No   Do you have trouble with steps or getting out of a bed or a chair? No   Current Diet Limited Junk Food   Dental Exam Up to date   Eye Exam Up to date   Exercise (times per week) 3 times per week   Current Exercises Include Walking;Yard Work;House Cleaning   Do you need help using the phone?  No   Are you deaf or do you have serious difficulty hearing?  No   Do you need help to go to places out of walking distance? No   Do you need help shopping? No   Do you need help preparing meals?  No   Do you need help with housework?  No   Do you need help with laundry? No   Do you need help taking your medications? No   Do you need help managing money? No   Do you ever drive or ride in a car without wearing a seat belt? No   Have you felt unusual stress, anger or loneliness in the last month? No   Who do you live with? Alone   If you need help, do you have trouble finding someone available to you? No   Have you been bothered in the last four weeks by sexual problems? No   Do you have difficulty concentrating, remembering or making decisions? No           Age-appropriate Screening Schedule:  Refer to the list below for future screening recommendations based on patient's age, sex and/or medical conditions. Orders for these recommended tests are listed in the plan section. The patient has been provided with a written plan.    Health  "Maintenance List  Health Maintenance   Topic Date Due    TDAP/TD VACCINES (1 - Tdap) Never done    ZOSTER VACCINE (1 of 2) Never done    BMI FOLLOWUP  04/15/2025    LIPID PANEL  11/04/2025    ANNUAL WELLNESS VISIT  11/11/2025    MAMMOGRAM  04/22/2026    DXA SCAN  08/27/2026    COLORECTAL CANCER SCREENING  02/05/2029    HEPATITIS C SCREENING  Completed    COVID-19 Vaccine  Completed    RSV Vaccine - Adults  Completed    INFLUENZA VACCINE  Completed    Pneumococcal Vaccine 65+  Completed                                                                                                                                                CMS Preventative Services Quick Reference  Risk Factors Identified During Encounter  Dental Screening Recommended  Vision Screening Recommended    The above risks/problems have been discussed with the patient.  Pertinent information has been shared with the patient in the After Visit Summary.  An After Visit Summary and PPPS were made available to the patient.    Follow Up:   Next Medicare Wellness visit to be scheduled in 1 year.         Additional E&M Note during same encounter follows:  Patient has additional, significant, and separately identifiable condition(s)/problem(s) that require work above and beyond the Medicare Wellness Visit     Chief Complaint  Medicare Wellness-subsequent (Patient is scheduled to see Dr. Jane Reeder 3/20/2025 for \"fogginess\" )    Subjective   HPI  Carmen is also being seen today for an annual adult preventative physical exam.                 Objective   Vital Signs:  /78 (BP Location: Left arm, Patient Position: Sitting, Cuff Size: Adult)   Pulse 73   Temp 96.9 °F (36.1 °C) (Temporal)   Ht 152.4 cm (60\")   Wt 73.9 kg (163 lb)   SpO2 98%   BMI 31.83 kg/m²   Physical Exam  HENT:      Head: Normocephalic and atraumatic.      Right Ear: Tympanic membrane and ear canal normal.      Left Ear: Tympanic membrane and ear canal normal.      Mouth/Throat:      " Mouth: Mucous membranes are moist.      Pharynx: Oropharynx is clear.   Eyes:      Conjunctiva/sclera: Conjunctivae normal.      Pupils: Pupils are equal, round, and reactive to light.   Cardiovascular:      Rate and Rhythm: Normal rate and regular rhythm.      Heart sounds: Normal heart sounds.   Pulmonary:      Effort: Pulmonary effort is normal. No respiratory distress.      Breath sounds: Normal breath sounds.   Musculoskeletal:         General: Swelling (trace) present.      Cervical back: Neck supple. No rigidity.   Skin:     General: Skin is warm and dry.      Findings: No rash.   Neurological:      General: No focal deficit present.      Mental Status: She is alert and oriented to person, place, and time.   Psychiatric:         Mood and Affect: Mood normal.         Behavior: Behavior normal.         Thought Content: Thought content normal.         Judgment: Judgment normal.       Labs from 11/4:  1.  CMP normal.  2.  Hemoglobin A1c 5.7, which is an improvement from 5.9 in May.  3.  TSH normal.  4.  Lipid panel acceptable.  5.  Vitamin D normal.  6.  CBC normal except for a slightly elevated MCV at 97.3.  7.  Urinalysis negative.        Assessment and Plan               Encounter for subsequent annual wellness visit in Medicare patient    Essential hypertension    Prediabetes    Other hyperlipidemia     Burning sensation of feet    No orders of the defined types were placed in this encounter.          Presents today for subsequent Medicare wellness visit as well as follow-up of the above.    She continues to travel frequently.  She is about to go to [place] for 2 weeks.  She is then going on a repositioning cruise to [facility] in the spring.    Blood pressure slightly elevated today at 140/78.  She checks her blood pressure regularly at home and does not get readings this high.  Her blood pressure has been well-controlled on HCTZ monotherapy.  She had previously been taken off losartan due to  difficulties with orthostasis.    Her hemoglobin A1c is improved to 5.7 from 5.9 in May.  Continue to monitor diet.    She tolerates 20 mg of atorvastatin without any problems.  She states that her total cholesterol had previously run in the 200 range when she was following with Dr. Sanford.  Her most recent total cholesterol was 165.  LDL less than 100, HDL greater than 50.    She still has difficulty with a burning sensation in her feet.  She has long been suspected of a nondiabetic peripheral neuropathy.  She had an EMG/NCV in August that was normal.  However, explained to her that this does not account for small fiber changes.  She does have an appointment to see Dr. Reeder in March.  B12 normal in April.     Just saw ENT on 11/6.  Will see as needed now.  They talked about potential surgical interventions of which she has no interest.    Left breast ultrasound in October with plans to repeat in 6 months.  Repeat mammogram and left breast ultrasound are scheduled for April 2025.     DEXA scan was normal in August 2024.  Continue vitamin D supplementation.    Repeat colonoscopy due in February 2029 if she wishes to continue screening.     In September, she received the seasonal flu vaccine and COVID-19 booster.      Counseled on appropriate vision and dental screening. She sees Dr. Chow for eye care. Gets Botox injections from him for eye twitching.  She had previous cryotherapy for retinal issues with Dr. Sanabria.  For dentistry, she follows with Dr. Jackson.    Plan to see her back in February.  If she is doing well at that point in time, then we can start seeing her on a every 6-month basis.  She knows that she can reach out sooner if problems.    Follow Up   No follow-ups on file.  Patient was given instructions and counseling regarding her condition or for health maintenance advice. Please see specific information pulled into the AVS if appropriate.

## 2025-02-26 ENCOUNTER — OFFICE VISIT (OUTPATIENT)
Dept: INTERNAL MEDICINE | Facility: CLINIC | Age: 75
End: 2025-02-26
Payer: MEDICARE

## 2025-02-26 VITALS
BODY MASS INDEX: 32.2 KG/M2 | OXYGEN SATURATION: 96 % | HEIGHT: 60 IN | DIASTOLIC BLOOD PRESSURE: 76 MMHG | SYSTOLIC BLOOD PRESSURE: 140 MMHG | TEMPERATURE: 96.9 F | WEIGHT: 164 LBS | HEART RATE: 74 BPM

## 2025-02-26 DIAGNOSIS — R20.8 BURNING SENSATION OF FEET: ICD-10-CM

## 2025-02-26 DIAGNOSIS — I10 ESSENTIAL HYPERTENSION: ICD-10-CM

## 2025-02-26 DIAGNOSIS — E78.49 OTHER HYPERLIPIDEMIA: ICD-10-CM

## 2025-02-26 DIAGNOSIS — Z86.19 HISTORY OF VIRAL ILLNESS: Primary | ICD-10-CM

## 2025-02-26 DIAGNOSIS — R73.03 PREDIABETES: ICD-10-CM

## 2025-02-26 PROCEDURE — 3078F DIAST BP <80 MM HG: CPT | Performed by: INTERNAL MEDICINE

## 2025-02-26 PROCEDURE — G2211 COMPLEX E/M VISIT ADD ON: HCPCS | Performed by: INTERNAL MEDICINE

## 2025-02-26 PROCEDURE — 99214 OFFICE O/P EST MOD 30 MIN: CPT | Performed by: INTERNAL MEDICINE

## 2025-02-26 PROCEDURE — 1125F AMNT PAIN NOTED PAIN PRSNT: CPT | Performed by: INTERNAL MEDICINE

## 2025-02-26 PROCEDURE — 3077F SYST BP >= 140 MM HG: CPT | Performed by: INTERNAL MEDICINE

## 2025-02-26 NOTE — PROGRESS NOTES
"    Chief Complaint  Follow-up (3 month follow up.  Scheduled to see Dr. Jane Reeder on 3/20/2025), Hypertension, Prediabetes, Cough (Dry cough x 1 week.  Started felling bad while on her cruise.  Was seen by the boat medical staff. Tested negative for covid. ), and Fatigue    Subjective        Mally De La Cruz presents to [facility]  History of Present Illness  See below.     Objective   Vital Signs:  /76 (BP Location: Left arm, Patient Position: Sitting, Cuff Size: Adult)   Pulse 74   Temp 96.9 °F (36.1 °C) (Temporal)   Ht 152.4 cm (60\")   Wt 74.4 kg (164 lb)   SpO2 96%   BMI 32.03 kg/m²   Estimated body mass index is 32.03 kg/m² as calculated from the following:    Height as of this encounter: 152.4 cm (60\").    Weight as of this encounter: 74.4 kg (164 lb).         Physical Exam  HENT:      Head: Normocephalic and atraumatic.      Right Ear: Tympanic membrane and ear canal normal.      Left Ear: Tympanic membrane and ear canal normal.      Mouth/Throat:      Mouth: Mucous membranes are moist.      Pharynx: Oropharynx is clear.   Eyes:      Conjunctiva/sclera: Conjunctivae normal.      Pupils: Pupils are equal, round, and reactive to light.   Cardiovascular:      Rate and Rhythm: Normal rate and regular rhythm.      Heart sounds: Normal heart sounds.   Pulmonary:      Effort: Pulmonary effort is normal. No respiratory distress.      Breath sounds: Normal breath sounds.   Musculoskeletal:         General: No swelling.      Cervical back: Neck supple.   Lymphadenopathy:      Cervical: No cervical adenopathy.   Skin:     General: Skin is warm and dry.      Findings: No rash.   Neurological:      General: No focal deficit present.      Mental Status: She is alert and oriented to person, place, and time.   Psychiatric:         Mood and Affect: Mood normal.         Behavior: Behavior normal.         Thought Content: Thought content normal.         Judgment: Judgment " "normal.        Result Review :  Nothing new to review.         Assessment and Plan   Diagnoses and all orders for this visit:    1. History of viral illness (Primary)    2. Essential hypertension    3. Prediabetes    4. Other hyperlipidemia    5. Burning sensation of feet       Presents today for follow-up.    She went on a cruise from 2/10-2/22.  She was on [employer].  She went from [place] to [facility] and back.  She states that on 2/18 she started having problems with nausea, dry cough, and fever.  She recorded a fever of 102.2 in her state room and then when she was seen by the nurse on the ship she had a fever of 100.4.  She was swabbed for COVID-19, which was negative.  She was not swabbed for influenza.  She was given over-the-counter medications for symptoms.  She states that overall she feels much better, but continues to have a slight cough, nausea, and is very fatigued.  However, she also blames the fatigue on the fact that going from [facility] to [place] to [place] a few days ago entailed 7 time zones.  She does not wish to have any further treatment or particular input.  I highly likely think she had influenza despite being vaccinated.  She will let me know if she has any worsening of her symptoms.  She states that today is the first day that she finally has \"felt a little better.\"    Blood pressure today is 140/76.  When taken on the crew ship it was 119/76.  She states that at home not too long ago she felt a little orthostatic and had a blood pressure of 107/72.  No plans to change her HCTZ for now.  Continue to monitor blood pressure and any symptoms.    Her hemoglobin A1c was 5.7 in November.  Not quite due to check again.  Plan to check in May.    Tolerating atorvastatin without any problems. She will be due for lipids in May.    She finally sees Dr. Reeder in March for her likely small fiber peripheral neuropathy.    She comes back in May for reevaluation.  If she continues to do well at that " time then we will move her out to 6 months and do her Medicare wellness visit in November.      Follow Up   No follow-ups on file.  Patient was given instructions and counseling regarding her condition or for health maintenance advice. Please see specific information pulled into the AVS if appropriate.      KARYN Osorio DO       Electronically signed by RONNY Osorio DO, 02/26/25, 2:02 PM CST.

## 2025-03-20 ENCOUNTER — LAB (OUTPATIENT)
Dept: LAB | Facility: HOSPITAL | Age: 75
End: 2025-03-20
Payer: MEDICARE

## 2025-03-20 ENCOUNTER — OFFICE VISIT (OUTPATIENT)
Dept: NEUROLOGY | Facility: CLINIC | Age: 75
End: 2025-03-20
Payer: MEDICARE

## 2025-03-20 VITALS
OXYGEN SATURATION: 99 % | DIASTOLIC BLOOD PRESSURE: 74 MMHG | HEART RATE: 73 BPM | WEIGHT: 164 LBS | BODY MASS INDEX: 32.2 KG/M2 | HEIGHT: 60 IN | SYSTOLIC BLOOD PRESSURE: 122 MMHG

## 2025-03-20 DIAGNOSIS — G62.9 SMALL FIBER NEUROPATHY: ICD-10-CM

## 2025-03-20 DIAGNOSIS — R42 VERTIGO: ICD-10-CM

## 2025-03-20 DIAGNOSIS — G62.9 SMALL FIBER NEUROPATHY: Primary | ICD-10-CM

## 2025-03-20 PROCEDURE — 3078F DIAST BP <80 MM HG: CPT | Performed by: PSYCHIATRY & NEUROLOGY

## 2025-03-20 PROCEDURE — 99204 OFFICE O/P NEW MOD 45 MIN: CPT | Performed by: PSYCHIATRY & NEUROLOGY

## 2025-03-20 PROCEDURE — 84155 ASSAY OF PROTEIN SERUM: CPT

## 2025-03-20 PROCEDURE — 1160F RVW MEDS BY RX/DR IN RCRD: CPT | Performed by: PSYCHIATRY & NEUROLOGY

## 2025-03-20 PROCEDURE — 84207 ASSAY OF VITAMIN B-6: CPT

## 2025-03-20 PROCEDURE — 3074F SYST BP LT 130 MM HG: CPT | Performed by: PSYCHIATRY & NEUROLOGY

## 2025-03-20 PROCEDURE — 36415 COLL VENOUS BLD VENIPUNCTURE: CPT

## 2025-03-20 PROCEDURE — 1159F MED LIST DOCD IN RCRD: CPT | Performed by: PSYCHIATRY & NEUROLOGY

## 2025-03-20 PROCEDURE — 84165 PROTEIN E-PHORESIS SERUM: CPT

## 2025-03-20 RX ORDER — DULOXETIN HYDROCHLORIDE 30 MG/1
CAPSULE, DELAYED RELEASE ORAL
Qty: 60 CAPSULE | Refills: 2 | Status: SHIPPED | OUTPATIENT
Start: 2025-03-20

## 2025-03-20 NOTE — PROGRESS NOTES
Subjective        Mally De La Cruz presents to [facility]    History of Present Illness  The patient is a 75-year-old female with a history of hypertension, hyperlipidemia, and prediabetes, referred for dysesthesias in her feet.    She reports experiencing a burning sensation in her feet, which began around the age of 59. Initially, the discomfort was localized to the heel, necessitating the use of a pillow under her legs to allow her feet to dangle off. This provided temporary relief until she fell asleep. Currently, she describes a tingling sensation without associated heat or burning. The symptoms are bilateral and primarily occur upon rising from bed. She has not experienced any preceding illness, chemotherapy, or heavy alcohol consumption. 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 She has not experienced any sciatica-like symptoms or numbness. A nerve conduction study was previously ordered by Dr. Osorio. She maintains foot hygiene through regular washing during showers. She has previously tried gabapentin 100 mg for almost a year and Lyrica 25 mg to 50 mg, but these medications did not provide relief.                    Current Outpatient Medications:     acetaminophen (TYLENOL) 500 MG tablet, Take 1 tablet by mouth As Needed., Disp: , Rfl:     ascorbic acid (VITAMIN C) 1000 MG tablet, Take 1 tablet by mouth Daily., Disp: , Rfl:     atorvastatin (LIPITOR) 20 MG tablet, Take 2 tablets by mouth Daily., Disp: 180 tablet, Rfl: 3    Cholecalciferol (VITAMIN D) 2000 units capsule, Take 1 capsule by mouth Daily., Disp: , Rfl:     docusate sodium (COLACE) 100 MG capsule, Take 1 capsule by mouth As Needed., Disp: , Rfl:     hydroCHLOROthiazide 12.5 MG tablet, Take 1 tablet by mouth Daily., Disp: 90 tablet, Rfl: 3    Multiple Vitamins-Minerals (PRESERVISION AREDS 2+MULTI VIT PO),  "Take 1 tablet by mouth Daily., Disp: , Rfl:     omeprazole (priLOSEC) 20 MG capsule, Take 1 capsule by mouth Daily., Disp: , Rfl:     Polyethyl Glycol-Propyl Glycol (SYSTANE OP), Apply  to eye(s) as directed by provider., Disp: , Rfl:     valACYclovir (VALTREX) 500 MG tablet, Take 1 tablet by mouth Daily., Disp: , Rfl:     Wheat Dextrin (BENEFIBER PO), Take  by mouth., Disp: , Rfl:     cetirizine (zyrTEC) 10 MG tablet, Take 1 tablet by mouth Daily. Takes half, Disp: , Rfl:     Melatonin 5 MG capsule, Take  by mouth., Disp: , Rfl:        Objective   Vital Signs:   /74   Pulse 73   Ht 152.4 cm (60\")   Wt 74.4 kg (164 lb)   SpO2 99%   BMI 32.03 kg/m²     Physical Exam     Neurological Exam    Result Review :            Results                 Assessment and Plan     Assessment & Plan  75-year-old female with HTN, HLD, preDM referred for burning in feet.  Likely consistent with small fiber polyneuropathy.  May be idiopathic.Had EMG which was normal in August 2024.  Has previously tried and failed gabapentin and Lyrica.  B12, folate, TSH normal.  A1c 5.7.    She reports having a \"foggy\" brain, which she decribes as a sensation of motion. She has seen ENT for this before.  Has not done vestibular rehab in the past.    Plan:    1.  Trial of duloxetine 30 mg daily for a week then 60 mg daily thereafter for her dysesthesia.  2.  Will get further lab workup.  3.  Referral for vestibular rehab.  4.  Follow-up 3 months.        Follow Up   No follow-ups on file.  Patient was given instructions and counseling regarding her condition or for health maintenance advice. Please see specific information pulled into the AVS if appropriate.         Patient or patient representative verbalized consent for the use of Ambient Listening during the visit with  Jane Reeder MD for chart documentation. 3/31/2025  13:20 CDT  "

## 2025-03-21 LAB
ALBUMIN SERPL ELPH-MCNC: 4.1 G/DL (ref 2.9–4.4)
ALBUMIN/GLOB SERPL: 1.4 {RATIO} (ref 0.7–1.7)
ALPHA1 GLOB SERPL ELPH-MCNC: 0.2 G/DL (ref 0–0.4)
ALPHA2 GLOB SERPL ELPH-MCNC: 0.8 G/DL (ref 0.4–1)
B-GLOBULIN SERPL ELPH-MCNC: 1 G/DL (ref 0.7–1.3)
GAMMA GLOB SERPL ELPH-MCNC: 0.9 G/DL (ref 0.4–1.8)
GLOBULIN SER CALC-MCNC: 2.9 G/DL (ref 2.2–3.9)
LABORATORY COMMENT REPORT: NORMAL
M PROTEIN SERPL ELPH-MCNC: NORMAL G/DL
PROT PATTERN SERPL ELPH-IMP: NORMAL
PROT SERPL-MCNC: 7 G/DL (ref 6–8.5)

## 2025-03-25 ENCOUNTER — HOSPITAL ENCOUNTER (OUTPATIENT)
Dept: PHYSICAL THERAPY | Facility: HOSPITAL | Age: 75
Setting detail: THERAPIES SERIES
Discharge: HOME | End: 2025-03-25
Payer: MEDICARE

## 2025-03-25 DIAGNOSIS — M53.82: Primary | ICD-10-CM

## 2025-03-25 PROCEDURE — 97162 PT EVAL MOD COMPLEX 30 MIN: CPT

## 2025-03-25 NOTE — THERAPY EVALUATION
Physical Therapy Initial Evaluation and Plan of Care  [facility] Outpatient Therapy Services  A department of [address] [facility], [place] [address]    Patient: Mally De La Cruz             : 1950  Today's Date: 3/25/2025  Referring practitioner: Jane Reeder MD  Date of Initial Visit: 3/25/2025    Visit Diagnoses:    ICD-10-CM ICD-9-CM   1. Dysfunction of cervical spine  M53.82 723.9     Past Medical History:   Diagnosis Date    Diverticulosis     GERD (gastroesophageal reflux disease)     History of adenomatous polyp of colon     History of basal cell carcinoma (BCC)     Left calf, nose, left forearm, right side of chest    History of colon polyps     Hyperlipidemia     Hypertension     Schatzki's ring     Spinal headache      Past Surgical History:   Procedure Laterality Date    ADENOIDECTOMY      BASAL CELL CARCINOMA EXCISION      BLEPHAROPLASTY      CATARACT EXTRACTION Bilateral     COLONOSCOPY N/A 2018    Diverticulosis in the entire examined colon; One 5mm tubular adenonatous polyp in the cecum; The examination was otherwise normal on direct and retroflexion views; Repeat 5 years    COLONOSCOPY  2013    One 6mm tubular adenomatous polyp in the transverse colon; Diverticulosis in the entire examined colon; Repeat 5 years    COLONOSCOPY  2009    One 5mm hyperplastic polyp in the transverse colon; Diverticulosis in the left colon; Repeat 4 years    COLONOSCOPY  2003    Diverticulosis; One 6mm polyp in the sigmoid colon-path shows benign large bowel mucosa demonstrating intramucosal mature lymphoid aggregate and mild non-specific reactive changes; Small internal hemorrhoids    COLONOSCOPY N/A 2024    Procedure: COLONOSCOPY WITH ANESTHESIA;  Surgeon: Shelly Palma MD;  Location: [facility] ENDOSCOPY;  Service: Gastroenterology;  Laterality: N/A;  preop; hx of polyps  postop diverticulosis ; polyps   PCP Carlos Sanford    ENDOSCOPY  2009    HH;  Schatzki's ring-Dilated to 20mm    ENDOSCOPY  02/06/2003    Normal endoscopy    REDUCTION MAMMAPLASTY Bilateral 2015    REPLACEMENT TOTAL KNEE Left 2018    RETINAL LASER PROCEDURE Bilateral     retinal tear bilateral    TONSILLECTOMY      TUBAL ABDOMINAL LIGATION         SUBJECTIVE     Subjective Evaluation    History of Present Illness  Mechanism of injury: She reports 5-6 years ago she started getting a foggy head, like her head feels full. It went away the first of the year but now its back. Denies dizziness, vertigo, vision changes, balance impairment, gentamicin use. ENT cleared her. Endorses tinnitus in the evenings, chronic neck pain and stiffness L > R with h/o WAD, atypical migraine intermittently, COVID-19 2023 and 2024. Nothing changes the feeling in her head.            Outcome Measure:   NDI: 13  PT G-Codes  Outcome Measure Options: Neck Disability Index (NDI)  Neck Disability Index Score: 13    OBJECTIVE     Cervicogenic Assessment:     Objective          Palpation   Left   Hypertonic in the cervical paraspinals, levator scapulae, scalenes, sternocleidomastoid and suboccipitals.   Tenderness of the cervical paraspinals, levator scapulae, scalenes, sternocleidomastoid and suboccipitals.     Right   Hypertonic in the cervical paraspinals, levator scapulae, scalenes, sternocleidomastoid and suboccipitals. Tenderness of the cervical paraspinals, levator scapulae, scalenes, sternocleidomastoid and suboccipitals.     Tenderness   Cervical Spine   Tenderness in the facet joint.     Active Range of Motion   Cervical/Thoracic Spine   Cervical    Flexion: 40 degrees with pain  Extension: 36 degrees with pain  Left lateral flexion: 16 degrees with pain  Right lateral flexion: 10 degrees with pain  Left rotation: 24 degrees with pain  Right rotation: 28 degrees with pain        OCCULOMOTOR EXAM  EOM ROM: normal yevgeniy in all planes   Spontaneous Nystagmus:  normal  Gaze Induced Nystagmus: normal  Smooth Pursuit:                   Normal bilateral    Convergence:                      Normal  Saccades:    Normal     VESTIBULO-OCCULAR TESTS  VOR 1 (head only)              Normal bilateral  VOR 2 (head and object)   Normal bilateral  VOR Cancellation               Normal bilateral  Head Thrust Test                Normal  DVA:    Negative for head motion induced oscillopsia     Therapy Education/Self Care 66450   Education offered today POC, nature of condition   Medbride Code    Ongoing HEP   To be assigned   Timed Minutes        Total Timed Treatment:     0   mins  Total Time of Visit:            45   mins    ASSESSMENT/PLAN      Goals                                          Progress Note due by 4/24/2025                                                      Recert due by 6/23/2028   STG by: 6 weeks Comments Date Status   Patient will report a >/= 50% reduction in sensation of head fullness/fogginess      Patient to improve NDI score to </=8      Patient to report >/=50% improvement in cervical mm tightness and pain.      LTG by: 12 weeks      Patient will report a 75% reduction in sensation of head fullness/fogginess      Patient to report >/=75% improvement in cervical mm tightness and pain.      Patient to improve cervical rotation AROM to >/=45 degrees bilaterally and pain-free.      Patient to improve NDI score to </=4      Patient to improve cervical extension AROM to >/=45 degrees and pain-free.      Anticipated CPT codes: Gait Training 58180, Therapeutic Exercise 47076, Manual Therapy 24341, Therapeutic Activity 32094, Neuromuscular ReEducation 48740, Self Care/Home Management 00322, Estim Attended 66233, Estim Unattended 34190, Canalith Repositioning 83605, and Traction 23762      Assessment & Plan       Assessment  Impairments: abnormal muscle tone, abnormal or restricted ROM, activity intolerance, impaired physical strength, lacks appropriate home exercise program and pain with function   Functional limitations: carrying  objects, lifting, sleeping, pulling, pushing, uncomfortable because of pain, moving in bed and unable to perform repetitive tasks   Assessment details: Carmen De La Cruz is a 75 year old female who presents with chronic cervical pain and CC of head fogginess and fullness. Special testing was not suggestive of central or peripheral vestibular dysfunction. Her CC could not be elicited or changed today. Cervicogenic dizziness is the top differential diagnosis given her presentation and history. Due to the aforementioned anatomical and functional limitations, the pt will require skilled OP PT services to optimize functional capacity and QOL.   Prognosis: good    Plan  Therapy options: will be seen for skilled therapy services  Planned modality interventions: cryotherapy, dry needling, electrical stimulation/Russian stimulation, TENS, low level laser therapy, thermotherapy (hydrocollator packs), traction and ultrasound  Planned therapy interventions: manual therapy, motor coordination training, ADL retraining, balance/weight-bearing training, neuromuscular re-education, body mechanics training, postural training, fine motor coordination training, soft tissue mobilization, spinal/joint mobilization, flexibility, functional ROM exercises, strengthening, stretching, home exercise program, therapeutic activities, IADL retraining and joint mobilization  Frequency: 2x week  Duration in weeks: 12  Treatment plan discussed with: patient  Plan details: Patient will be seen 2x/wk x 12wks with treatment to include joint position sense training/NMR, postural strengthening/stretching, and manual therapy.        SIGNATURE: Carrol Clark PT DPT, [place] License #: [id number]  Electronically Signed on 3/25/2025    Initial Certification  Certification Period: 3/25/2025 through 6/22/2025  I certify that the therapy services are furnished while this patient is under my care.  The services outlined above are required by this patient, and will  be reviewed every 90 days.     PHYSICIAN: Jane Reeder MD (NPI: [id number])    Signature:_________________________________________DATE: _________    Please sign and return via fax to [phone].   Thank you so much for letting us work with Mally. I appreciate your letting us work with your patients. If you have any questions or concerns, please don't hesitate to contact me.          [address] [address]  [phone]

## 2025-03-26 LAB — PYRIDOXAL PHOS SERPL-MCNC: 11.8 UG/L (ref 3.4–65.2)

## 2025-03-28 ENCOUNTER — TELEPHONE (OUTPATIENT)
Dept: NEUROLOGY | Facility: CLINIC | Age: 75
End: 2025-03-28
Payer: MEDICARE

## 2025-03-28 NOTE — TELEPHONE ENCOUNTER
AFTER TALKING WITH DR GALAN. SHE STATED THAT PATIENT WOULD BE OKAY TO TAKE BOTH CAPSULES AT THE SAME TIME. WE HAD TO WRITE FOR 30MG DUE TO THE SLIGHT TITRATION. I LET PATIENT KNOW TO FINISH OUT WITH THE 30MG TAKING 2 A DAY AND ONCE SHE IS OUT OF THOSE TO GIVE US A CALL AND REMIND US TO CHANGE THAT OVER TO A 60MG CAPSULE SO SHE WILL ONLY NEED TO TAKE 1 OF THOSE A DAY. PATIENT VOICED UNDERSTANDING.

## 2025-03-28 NOTE — TELEPHONE ENCOUNTER
"    Caller: Mally De La Cruz \"Carmen\"    Relationship: Self    Best call back number: [phone]      Which medication are you concerned about: CYMBALTA    Who prescribed you this medication: DR. GALAN    When did you start taking this medication: 03/20/25    What are your concerns: DR. GALAN WROTE RX FOR 30MG TO BE TAKEN ONCE DAILY FOR 1 WEEK AND THEN 2 CAPSULES DAILY THEREAFTER, PT WANTS TO KNOW IF SHE CAN TAKE THE INCREASED DOSAGE TOGETHER AT NIGHT.    How long have you had these concerns: TODAY          "

## 2025-03-31 LAB
NCCN CRITERIA FLAG: NORMAL
TYRER CUZICK SCORE: 2.3

## 2025-04-02 ENCOUNTER — HOSPITAL ENCOUNTER (OUTPATIENT)
Dept: PHYSICAL THERAPY | Facility: HOSPITAL | Age: 75
Setting detail: THERAPIES SERIES
Discharge: HOME | End: 2025-04-02
Payer: MEDICARE

## 2025-04-02 DIAGNOSIS — M53.82: Primary | ICD-10-CM

## 2025-04-02 PROCEDURE — 97110 THERAPEUTIC EXERCISES: CPT

## 2025-04-02 PROCEDURE — 97140 MANUAL THERAPY 1/> REGIONS: CPT

## 2025-04-02 NOTE — THERAPY TREATMENT NOTE
Physical Therapy Treatment Note  [facility] Outpatient Therapy Services  A department [facility]  [address], [place] [address]    Patient: Mally De La Cruz                                                 Visit Date: 2025  :     1950    Referring practitioner:    Jane Reeder MD  Date of Initial Visit:          Type: THERAPY  Episode: cervicogenic dizziness  Number of visits this episode: 2    Visit Diagnoses:    ICD-10-CM ICD-9-CM   1. Dysfunction of cervical spine  M53.82 723.9     SUBJECTIVE     Subjective: Nothing new to report.     PAIN: 6/10       OBJECTIVE     Objective     Manual Therapy     13767  Comments   CT UPA grade 2/3    STM cervical region grossly    Timed Minutes 30       Therapeutic Exercises    68330 Units Comments   SL open book 10 B    SNAG rotation 5 se cholds x5 B    Timed Minutes 15       Therapy Education/Self Care 07317   Education offered today HEP   Medbride Code [email or web address]   Ongoing HEP     Date: 2025  Prepared by: Carrol Clark    Exercises  - Seated Assisted Cervical Rotation with Towel  - 2 x daily - 4 x weekly - 10 reps - 5 sec hold  - Sidelying Open Book Thoracic Rotation with Knee on Foam Roll  - 1 x daily - 4 x weekly - 2-3 sets - 10 reps   Timed Minutes        Total Timed Treatment:     45   mins  Total Time of Visit:             45   mins         ASSESSMENT/PLAN     Goals                                          Progress Note due by 2025                                                      Recert due by 2028   STG by: 6 weeks Comments Date Status   Patient will report a >/= 50% reduction in sensation of head fullness/fogginess         Patient to improve NDI score to </=8         Patient to report >/=50% improvement in cervical mm tightness and pain.         LTG by: 12 weeks         Patient will report a 75% reduction in sensation of head fullness/fogginess         Patient to report >/=75% improvement in cervical mm  tightness and pain.         Patient to improve cervical rotation AROM to >/=45 degrees bilaterally and pain-free.         Patient to improve NDI score to </=4         Patient to improve cervical extension AROM to >/=45 degrees and pain-free.         Anticipated CPT codes: Gait Training 82346, Therapeutic Exercise 76958, Manual Therapy 50483, Therapeutic Activity 82471, Neuromuscular ReEducation 86547, Self Care/Home Management 23818, Estim Attended 08887, Estim Unattended 85773, Canalith Repositioning 77543, and Traction 21424    Assessment/Plan     ASSESSMENT: Assigned initial HEP for gentle cervical mobility which she trialed with minimal difficulty or pain exacerbation today.     PLAN: joint position sense, cervical manual    SIGNATURE: Carrol Clark PT DPT, [place] License #: [id number]  Electronically Signed on 4/2/2025        [place] [address]  [phone]

## 2025-04-07 ENCOUNTER — HOSPITAL ENCOUNTER (OUTPATIENT)
Dept: PHYSICAL THERAPY | Facility: HOSPITAL | Age: 75
Setting detail: THERAPIES SERIES
Discharge: HOME | End: 2025-04-07
Payer: MEDICARE

## 2025-04-07 ENCOUNTER — TELEPHONE (OUTPATIENT)
Dept: OBSTETRICS AND GYNECOLOGY | Age: 75
End: 2025-04-07
Payer: MEDICARE

## 2025-04-07 DIAGNOSIS — M53.82: Primary | ICD-10-CM

## 2025-04-07 PROCEDURE — 97140 MANUAL THERAPY 1/> REGIONS: CPT

## 2025-04-07 NOTE — THERAPY TREATMENT NOTE
Physical Therapy Treatment Note  [facility] Outpatient Therapy Services  A department [facility]  [address], [place] [address]    Patient: Mally De La Cruz                                                 Visit Date: 2025  :     1950    Referring practitioner:    Jane Reeder MD  Date of Initial Visit:          Type: THERAPY  Episode: cervicogenic dizziness  Number of visits this episode: 3    Visit Diagnoses:    ICD-10-CM ICD-9-CM   1. Dysfunction of cervical spine  M53.82 723.9     SUBJECTIVE     Subjective: She had a question about her HEP.     PAIN: 4/10       OBJECTIVE     Objective     Manual Therapy     11313  Comments   Cervical traction    CT UPA grade 2/3    STM cervical region grossly    Timed Minutes 45     Therapy Education/Self Care 86932   Education offered today HEP   Medbride Code [id number]   Ongoing HEP     Date: 2025  Prepared by: Carrol Clark    Exercises  - Seated Assisted Cervical Rotation with Towel  - 2 x daily - 4 x weekly - 10 reps - 5 sec hold  - Sidelying Open Book Thoracic Rotation with Knee on Foam Roll  - 1 x daily - 4 x weekly - 2-3 sets - 10 reps   Timed Minutes        Total Timed Treatment:     45   mins  Total Time of Visit:             45   mins         ASSESSMENT/PLAN     Goals                                          Progress Note due by 2025                                                      Recert due by 2028   STG by: 6 weeks Comments Date Status   Patient will report a >/= 50% reduction in sensation of head fullness/fogginess         Patient to improve NDI score to </=8         Patient to report >/=50% improvement in cervical mm tightness and pain.         LTG by: 12 weeks         Patient will report a 75% reduction in sensation of head fullness/fogginess         Patient to report >/=75% improvement in cervical mm tightness and pain.         Patient to improve cervical rotation AROM to >/=45 degrees bilaterally and  pain-free.         Patient to improve NDI score to </=4         Patient to improve cervical extension AROM to >/=45 degrees and pain-free.         Anticipated CPT codes: Gait Training 89577, Therapeutic Exercise 63391, Manual Therapy 85081, Therapeutic Activity 22965, Neuromuscular ReEducation 80194, Self Care/Home Management 34663, Estim Attended 50453, Estim Unattended 58365, Canalith Repositioning 33626, and Traction 76081    Assessment/Plan     ASSESSMENT: Her cervical AROM and/or ability to isolate her head from her trunk is improving per visual exam.    PLAN: joint position sense, cervical manual    SIGNATURE: Carrol Clark PT DPT, [place] License #: [id number]  Electronically Signed on 4/7/2025        [place] [address]  [phone]

## 2025-04-07 NOTE — TELEPHONE ENCOUNTER
Pt called in due to seeing [facility] results online and asking about follow up if she would be notified. Advised this was her screening pre-mammogram and would discuss with her at the time of mammogram. Pt v/u.

## 2025-04-09 ENCOUNTER — HOSPITAL ENCOUNTER (OUTPATIENT)
Dept: PHYSICAL THERAPY | Facility: HOSPITAL | Age: 75
Setting detail: THERAPIES SERIES
Discharge: HOME | End: 2025-04-09
Payer: MEDICARE

## 2025-04-09 DIAGNOSIS — M53.82: Primary | ICD-10-CM

## 2025-04-09 PROCEDURE — 97112 NEUROMUSCULAR REEDUCATION: CPT

## 2025-04-09 PROCEDURE — 97140 MANUAL THERAPY 1/> REGIONS: CPT

## 2025-04-09 NOTE — THERAPY TREATMENT NOTE
Physical Therapy Treatment Note  [facility] Outpatient Therapy Services  A department [facility]  [address], [place] [address]    Patient: Mally De La Cruz                                                 Visit Date: 2025  :     1950    Referring practitioner:    Jane Reeder MD  Date of Initial Visit:          Type: THERAPY  Episode: cervicogenic dizziness  Number of visits this episode: 4    Visit Diagnoses:    ICD-10-CM ICD-9-CM   1. Dysfunction of cervical spine  M53.82 723.9     SUBJECTIVE     Subjective: She hears popping when she turns her head when driving.    PAIN: /10       OBJECTIVE     Objective     Neuromuscular Reeducation     14793 Comments   Joint position sense training    Timed Minutes 30       Manual Therapy     31808  Comments   Cervical traction    CT UPA grade 2/3    STM cervical region grossly    Timed Minutes 30     Therapy Education/Self Care 49367   Education offered today HEP   Medbride Code [email or web address]   Ongoing HEP     Date: 2025  Prepared by: Carrol Clark    Exercises  - Seated Assisted Cervical Rotation with Towel  - 2 x daily - 4 x weekly - 10 reps - 5 sec hold  - Sidelying Open Book Thoracic Rotation with Knee on Foam Roll  - 1 x daily - 4 x weekly - 2-3 sets - 10 reps   Timed Minutes        Total Timed Treatment:     60   mins  Total Time of Visit:             60   mins         ASSESSMENT/PLAN     Goals                                          Progress Note due by 2025                                                      Recert due by 2028   STG by: 6 weeks Comments Date Status   Patient will report a >/= 50% reduction in sensation of head fullness/fogginess         Patient to improve NDI score to </=8         Patient to report >/=50% improvement in cervical mm tightness and pain.         LTG by: 12 weeks         Patient will report a 75% reduction in sensation of head fullness/fogginess         Patient to report >/=75%  improvement in cervical mm tightness and pain.         Patient to improve cervical rotation AROM to >/=45 degrees bilaterally and pain-free.         Patient to improve NDI score to </=4         Patient to improve cervical extension AROM to >/=45 degrees and pain-free.         Anticipated CPT codes: Gait Training 79994, Therapeutic Exercise 31477, Manual Therapy 54345, Therapeutic Activity 67782, Neuromuscular ReEducation 71883, Self Care/Home Management 16467, Estim Attended 20877, Estim Unattended 11687, Canalith Repositioning 72173, and Traction 46003    Assessment/Plan     ASSESSMENT: She exhibits impaired cervical joint position sense which contributes to her current presentation and will continue to be progressed in subsequent sessions.    PLAN: joint position sense, cervical manual    SIGNATURE: Carrol Clark PT DPT, [place] License #: [id number]  Electronically Signed on 4/9/2025        [place] [address]  [phone]

## 2025-04-14 ENCOUNTER — HOSPITAL ENCOUNTER (OUTPATIENT)
Dept: PHYSICAL THERAPY | Facility: HOSPITAL | Age: 75
Setting detail: THERAPIES SERIES
Discharge: HOME | End: 2025-04-14
Payer: MEDICARE

## 2025-04-14 ENCOUNTER — TELEPHONE (OUTPATIENT)
Dept: NEUROLOGY | Facility: CLINIC | Age: 75
End: 2025-04-14
Payer: MEDICARE

## 2025-04-14 DIAGNOSIS — M53.82: Primary | ICD-10-CM

## 2025-04-14 PROCEDURE — 97112 NEUROMUSCULAR REEDUCATION: CPT

## 2025-04-14 PROCEDURE — 97140 MANUAL THERAPY 1/> REGIONS: CPT

## 2025-04-14 NOTE — TELEPHONE ENCOUNTER
"Provider: DR GALAN    Caller: Mally De La Cruz \"Carmen\"    Relationship to Patient: Self    Phone Number: [phone]    Reason for Call: STATES SHE HAS BEEN TAKING THE CYMBALTA 60 MG DAILY FOR ABOUT 2 WEEKS NOW. STATES SHE HAS BEEN HAVING MORE DIZZINESS RECENTLY & SOME NAUSEA. ALSO STATES SHE HAS NOT NOTICED MUCH IMPROVEMENT. WOULD LIKE TO SEE ABOUT STOPPING THIS MEDICATION. PLEASE REVIEW & ADVISE, THANK YOU.  "

## 2025-04-14 NOTE — THERAPY TREATMENT NOTE
Physical Therapy Treatment Note  [facility] Outpatient Therapy Services  A department [facility]  [address], [place] [address]    Patient: Mally De La Cruz                                                 Visit Date: 2025  :     1950    Referring practitioner:    Jane Reeder MD  Date of Initial Visit:          Type: THERAPY  Episode: cervicogenic dizziness  Number of visits this episode: 5    Visit Diagnoses:    ICD-10-CM ICD-9-CM   1. Dysfunction of cervical spine  M53.82 723.9     SUBJECTIVE     Subjective: She got dizzy for ~20 secs when she attempted to roll from her R side to her back. She has been having queasiness and double vision when she turns her head. She called the neurologist office this morning and has not yet received a call back. She is backing off the medicine because it is the only new thing that's different.    PAIN: 6/10       OBJECTIVE     Objective     Neuromuscular Reeducation     05105 Comments   Half-lying test Negative for s/s and nystagmus   VBI testing Diploplia with right head turn   Supine roll test Negative for s/s and nystagmus   Timed Minutes 15       Manual Therapy     48181  Comments   Cervical traction    CT UPA grade 2/3    STM cervical region grossly    Timed Minutes 25     Therapy Education/Self Care 96985   Education offered today HEP   [facility] Code [id number]   Ongoing HEP     Date: 2025  Prepared by: Carrol Clark    Exercises  - Seated Assisted Cervical Rotation with Towel  - 2 x daily - 4 x weekly - 10 reps - 5 sec hold  - Sidelying Open Book Thoracic Rotation with Knee on Foam Roll  - 1 x daily - 4 x weekly - 2-3 sets - 10 reps   Timed Minutes        Total Timed Treatment:     40   mins  Total Time of Visit:             40   mins         ASSESSMENT/PLAN     Goals                                          Progress Note due by 2025                                                      Recert due by 2028   STG by: 6  weeks Comments Date Status   Patient will report a >/= 50% reduction in sensation of head fullness/fogginess         Patient to improve NDI score to </=8         Patient to report >/=50% improvement in cervical mm tightness and pain.         LTG by: 12 weeks         Patient will report a 75% reduction in sensation of head fullness/fogginess         Patient to report >/=75% improvement in cervical mm tightness and pain.         Patient to improve cervical rotation AROM to >/=45 degrees bilaterally and pain-free.         Patient to improve NDI score to </=4         Patient to improve cervical extension AROM to >/=45 degrees and pain-free.         Anticipated CPT codes: Gait Training 89775, Therapeutic Exercise 56529, Manual Therapy 66715, Therapeutic Activity 65421, Neuromuscular ReEducation 12275, Self Care/Home Management 37851, Estim Attended 96889, Estim Unattended 53118, Canalith Repositioning 51193, and Traction 63701    Assessment/Plan     ASSESSMENT: She presented with pallor and diploplia today with one instance of what sounded like a potential BPPV though positional testing was negative. Her diplopia was reproduced in VBI testing to right however she experienced it out of the VBI position as well. She was advised to seek care in an urgent care setting should the s/s continue or worsen especially since she will be driving for several hours for a trip in a few days.    PLAN: joint position sense, cervical manual    SIGNATURE: Carrol Clark PT [place], [place] License #: [id number]  Electronically Signed on 4/14/2025        [place] [address]  [phone]

## 2025-04-15 ENCOUNTER — HOSPITAL ENCOUNTER (OUTPATIENT)
Dept: MAMMOGRAPHY | Facility: HOSPITAL | Age: 75
Discharge: HOME | End: 2025-04-15
Payer: MEDICARE

## 2025-04-15 ENCOUNTER — HOSPITAL ENCOUNTER (OUTPATIENT)
Dept: ULTRASOUND IMAGING | Facility: HOSPITAL | Age: 75
Discharge: HOME | End: 2025-04-15
Payer: MEDICARE

## 2025-04-15 DIAGNOSIS — N60.02 SOLITARY CYST OF LEFT BREAST: ICD-10-CM

## 2025-04-15 PROCEDURE — 76642 ULTRASOUND BREAST LIMITED: CPT

## 2025-04-15 PROCEDURE — 77066 DX MAMMO INCL CAD BI: CPT

## 2025-04-15 PROCEDURE — G0279 TOMOSYNTHESIS, MAMMO: HCPCS

## 2025-04-15 NOTE — TELEPHONE ENCOUNTER
Spoke to pt and explained that Dr. Reeder stated- 2 weeks isn't long enough to notice benefit. Would need to take for at least 6-8 weeks minimum. However, if she is having side effects, we can stop it or decrease it. Either stop or go back to 30 mg daily.     Pt voiced understanding.    Pt wishes to no longer take the medication and will contact the office for any further updates if needed.

## 2025-04-16 ENCOUNTER — APPOINTMENT (OUTPATIENT)
Dept: PHYSICAL THERAPY | Facility: HOSPITAL | Age: 75
End: 2025-04-16
Payer: MEDICARE

## 2025-04-21 ENCOUNTER — HOSPITAL ENCOUNTER (OUTPATIENT)
Dept: PHYSICAL THERAPY | Facility: HOSPITAL | Age: 75
Setting detail: THERAPIES SERIES
Discharge: HOME | End: 2025-04-21
Payer: MEDICARE

## 2025-04-21 DIAGNOSIS — M53.82: Primary | ICD-10-CM

## 2025-04-21 PROCEDURE — 97110 THERAPEUTIC EXERCISES: CPT

## 2025-04-21 PROCEDURE — 97140 MANUAL THERAPY 1/> REGIONS: CPT

## 2025-04-21 NOTE — THERAPY TREATMENT NOTE
Physical Therapy Treatment Note  [facility] Therapy [facility]  A department [facility]  [address], [place] [address]    Patient: Mally De La Cruz                                                 Visit Date: 2025  :     1950    Referring practitioner:    Jane Reeder MD  Date of Initial Visit:          Type: THERAPY  Episode: cervicogenic dizziness  Number of visits this episode: 6    Visit Diagnoses:    ICD-10-CM ICD-9-CM   1. Dysfunction of cervical spine  M53.82 723.9     SUBJECTIVE     Subjective: She discontinued her cymbalta after talking with the neurologist office. She is still having the lightheadedness.    PAIN: 6/10       OBJECTIVE     Objective     Therapeutic Exercises    45549 Units Comments   SNAG rotation 5 sec holds B    SNAG extension 5 sec holds *5    SNAG lateral flexion 5 sec holds B    Scap ret + cerv ret 10 Green TB   Timed Minutes 30       Manual Therapy     92086  Comments   Cervical traction    CT UPA grade 2/3    STM cervical region grossly    Timed Minutes 15     Therapy Education/Self Care 11735   Education offered today HEP   Med[employer] Code [email or web address]   Ongoing HEP     Date: 2025  Prepared by: Carrol Clark    Exercises  - Seated Assisted Cervical Rotation with Towel  - 2 x daily - 4 x weekly - 10 reps - 5 sec hold  - Sidelying Open Book Thoracic Rotation with Knee on Foam Roll  - 1 x daily - 4 x weekly - 2-3 sets - 10 reps  - Cervical Extension AROM with Strap  - 4 x weekly - 2 sets - 10 reps - 5 sec hold  - Seated Cervical Retraction  - 1 x daily - 4 x weekly - 2-3 sets - 10 reps  - Standing Shoulder Row with Anchored Resistance  - 1 x daily - 4 x weekly - 2-3 sets - 10 reps   Timed Minutes        Total Timed Treatment:     45   mins  Total Time of Visit:             45   mins         ASSESSMENT/PLAN     Goals                                          Progress Note due by 2025                                                       Recert due by 6/23/2028   STG by: 6 weeks Comments Date Status   Patient will report a >/= 50% reduction in sensation of head fullness/fogginess         Patient to improve NDI score to </=8         Patient to report >/=50% improvement in cervical mm tightness and pain.         LTG by: 12 weeks         Patient will report a 75% reduction in sensation of head fullness/fogginess         Patient to report >/=75% improvement in cervical mm tightness and pain.         Patient to improve cervical rotation AROM to >/=45 degrees bilaterally and pain-free.         Patient to improve NDI score to </=4         Patient to improve cervical extension AROM to >/=45 degrees and pain-free.         Anticipated CPT codes: Gait Training 92739, Therapeutic Exercise 04466, Manual Therapy 29830, Therapeutic Activity 85615, Neuromuscular ReEducation 14053, Self Care/Home Management 36461, Estim Attended 84253, Estim Unattended 78642, Canalith Repositioning 29633, and Traction 04107    Assessment/Plan     ASSESSMENT: Bolstered her HEP for postural strengthening and segmental mobility today. She continues to experience the general lightheadedness but was not experiencing the diplopia today. The new medication could have been playing a role in her presentation last session.     PLAN: PN NEXT SESSION. joint position sense, cervical manual    SIGNATURE: Carrol Clark PT [place], [place] License #: [id number]  Electronically Signed on 4/21/2025        Andrei Lawrence. [address]  [phone]

## 2025-04-23 ENCOUNTER — HOSPITAL ENCOUNTER (OUTPATIENT)
Dept: PHYSICAL THERAPY | Facility: HOSPITAL | Age: 75
Setting detail: THERAPIES SERIES
Discharge: HOME | End: 2025-04-23
Payer: MEDICARE

## 2025-04-23 DIAGNOSIS — M53.82: Primary | ICD-10-CM

## 2025-04-23 PROCEDURE — 97112 NEUROMUSCULAR REEDUCATION: CPT

## 2025-04-23 PROCEDURE — 97110 THERAPEUTIC EXERCISES: CPT

## 2025-04-23 PROCEDURE — 97140 MANUAL THERAPY 1/> REGIONS: CPT

## 2025-04-23 NOTE — THERAPY TREATMENT NOTE
Physical Therapy Treatment Note and 30 Day Progress Note  [facility] Therapy [facility]  A department [facility]  [address], [place] [address]    Patient: Mally De La Cruz                                                 Visit Date: 2025  :     1950    Referring practitioner:    Jane Reeder MD  Date of Initial Visit:          Type: THERAPY  Episode: cervicogenic dizziness  Number of visits this episode: 7    Visit Diagnoses:    ICD-10-CM ICD-9-CM   1. Dysfunction of cervical spine  M53.82 723.9     SUBJECTIVE     Subjective: She is still seeing double when she turns her head. She has to close one eye when turning to see if anyone is coming when driving. The head fullness/fogginess hasn't changed.     PAIN: 6/10       OBJECTIVE     Objective     Therapeutic Exercises    94153 Units Comments   PN     Timed Minutes 20     Neuromuscular Reeducation     86783 Comments   Joint position sense    Timed Minutes 25       Manual Therapy     73739  Comments   Cervical traction    CT UPA grade 2/3    STM cervical region grossly    Timed Minutes 15     Therapy Education/Self Care 05038   Education offered today HEP   Med[employer] Code [email or web address]   Ongoing HEP     Date: 2025  Prepared by: Carrol Clark    Exercises  - Seated Assisted Cervical Rotation with Towel  - 2 x daily - 4 x weekly - 10 reps - 5 sec hold  - Sidelying Open Book Thoracic Rotation with Knee on Foam Roll  - 1 x daily - 4 x weekly - 2-3 sets - 10 reps  - Cervical Extension AROM with Strap  - 4 x weekly - 2 sets - 10 reps - 5 sec hold  - Seated Cervical Retraction  - 1 x daily - 4 x weekly - 2-3 sets - 10 reps  - Standing Shoulder Row with Anchored Resistance  - 1 x daily - 4 x weekly - 2-3 sets - 10 reps   Timed Minutes        Total Timed Treatment:     60   mins  Total Time of Visit:             60   mins         ASSESSMENT/PLAN     Goals                                          Progress Note due by 2025                                                       Recert due by 6/23/2028   STG by: 6 weeks Comments Date Status   Patient will report a >/= 50% reduction in sensation of head fullness/fogginess  She doesn't have the dizziness but the head fullness/fogginess has not changed  4/23  ongoing   Patient to improve NDI score to </=8  16 today  4/23  ongoing   Patient to report >/=50% improvement in cervical mm tightness and pain.    4/23  MET   LTG by: 12 weeks         Patient will report a 75% reduction in sensation of head fullness/fogginess  see above  4/23  ongoing   Patient to report >/=75% improvement in cervical mm tightness and pain.  see above  4/23  ongoing   Patient to improve cervical rotation AROM to >/=45 degrees bilaterally and pain-free.  22 L 30 R  4/23  ongoing   Patient to improve NDI score to </=4 See above  4/23  ongoing   Patient to improve cervical extension AROM to >/=45 degrees and pain-free.  32 deg  4/23  ongoing   Anticipated CPT codes: Gait Training 71791, Therapeutic Exercise 58394, Manual Therapy 77540, Therapeutic Activity 31279, Neuromuscular ReEducation 58750, Self Care/Home Management 62275, Estim Attended 83639, Estim Unattended 81319, Canalith Repositioning 80482, and Traction 57448    Assessment & Plan       Assessment  Impairments: abnormal muscle tone, abnormal or restricted ROM, activity intolerance, impaired physical strength, lacks appropriate home exercise program and pain with function   Functional limitations: carrying objects, lifting, sleeping, pulling, pushing, uncomfortable because of pain, moving in bed and unable to perform repetitive tasks   Prognosis: good    Plan  Therapy options: will be seen for skilled therapy services  Planned modality interventions: cryotherapy, dry needling, electrical stimulation/Russian stimulation, TENS, low level laser therapy, thermotherapy (hydrocollator packs), traction and ultrasound  Planned therapy interventions: manual therapy, motor  coordination training, ADL retraining, balance/weight-bearing training, neuromuscular re-education, body mechanics training, postural training, fine motor coordination training, soft tissue mobilization, spinal/joint mobilization, flexibility, functional ROM exercises, strengthening, stretching, home exercise program, therapeutic activities, IADL retraining and joint mobilization  Frequency: 2x week  Duration in weeks: 12  Treatment plan discussed with: patient       ASSESSMENT: Progress note performed per POC. She achieved one goal today for improvement in neck pain and tightness though her CC of head fullness/fogginess remains unchanged. Discussed POC moving forward that if by next progress note she still notes 0% improvement in these symptoms that skilled OP PT services may not yield desired results to which she verbalized understanding.     PLAN: joint position sense, cervical manual, postural strengthening    SIGNATURE: Carrol Clark PT DPT, [place] License #: [id number]  Electronically Signed on 4/23/2025        Shalom Burns  [place] [address]  [phone]

## 2025-04-28 ENCOUNTER — HOSPITAL ENCOUNTER (OUTPATIENT)
Dept: PHYSICAL THERAPY | Facility: HOSPITAL | Age: 75
Setting detail: THERAPIES SERIES
Discharge: HOME | End: 2025-04-28
Payer: MEDICARE

## 2025-04-28 DIAGNOSIS — M53.82: Primary | ICD-10-CM

## 2025-04-28 PROCEDURE — 97140 MANUAL THERAPY 1/> REGIONS: CPT

## 2025-04-28 NOTE — THERAPY TREATMENT NOTE
Physical Therapy Treatment Note  [facility] Therapy [facility]  A department [facility]  [address], [place] [address]    Patient: Mally De La Cruz                                                 Visit Date: 2025  :     1950    Referring practitioner:    Jane Reeder MD  Date of Initial Visit:          Type: THERAPY  Episode: cervicogenic dizziness  Number of visits this episode: 8    Visit Diagnoses:    ICD-10-CM ICD-9-CM   1. Dysfunction of cervical spine  M53.82 723.9     SUBJECTIVE     Subjective: Her back and neck have been hurting quite a bit.     PAIN: /10 neck       OBJECTIVE     Objective     Manual Therapy     87500  Comments   Cervical traction    CT UPA grade 2/3    STM cervical region grossly    Timed Minutes 45     Therapy Education/Self Care 47760   Education offered today HEP   Med[employer] Code [email or web address]   Ongoing HEP     Date: 2025  Prepared by: Carrol Clark    Exercises  - Seated Assisted Cervical Rotation with Towel  - 2 x daily - 4 x weekly - 10 reps - 5 sec hold  - Sidelying Open Book Thoracic Rotation with Knee on Foam Roll  - 1 x daily - 4 x weekly - 2-3 sets - 10 reps  - Cervical Extension AROM with Strap  - 4 x weekly - 2 sets - 10 reps - 5 sec hold  - Seated Cervical Retraction  - 1 x daily - 4 x weekly - 2-3 sets - 10 reps  - Standing Shoulder Row with Anchored Resistance  - 1 x daily - 4 x weekly - 2-3 sets - 10 reps   Timed Minutes        Total Timed Treatment:    45    mins  Total Time of Visit:            45    mins         ASSESSMENT/PLAN     Goals                                          Progress Note due by 2025                                                      Recert due by 2028   STG by: 6 weeks Comments Date Status   Patient will report a >/= 50% reduction in sensation of head fullness/fogginess  She doesn't have the dizziness but the head fullness/fogginess has not changed    ongoing   Patient to improve NDI  score to </=8  16 today  4/23  ongoing   Patient to report >/=50% improvement in cervical mm tightness and pain.    4/23  MET   LTG by: 12 weeks         Patient will report a 75% reduction in sensation of head fullness/fogginess  see above  4/23  ongoing   Patient to report >/=75% improvement in cervical mm tightness and pain.  see above  4/23  ongoing   Patient to improve cervical rotation AROM to >/=45 degrees bilaterally and pain-free.  22 L 30 R  4/23  ongoing   Patient to improve NDI score to </=4 See above  4/23  ongoing   Patient to improve cervical extension AROM to >/=45 degrees and pain-free.  32 deg  4/23  ongoing   Anticipated CPT codes: Gait Training 67438, Therapeutic Exercise 22823, Manual Therapy 48002, Therapeutic Activity 87643, Neuromuscular ReEducation 08869, Self Care/Home Management 42790, Estim Attended 02880, Estim Unattended 60100, Canalith Repositioning 16681, and Traction 55647    Assessment/Plan     ASSESSMENT: Proceeded with more conservative approach as her neck pain has been elevated the last few days.    PLAN: joint position sense, cervical manual, postural strengthening    SIGNATURE: Carrol Clark PT DPT, [place] License #: [id number]  Electronically Signed on 4/28/2025        Andrei Lawrence. [address]  [phone]

## 2025-04-30 ENCOUNTER — HOSPITAL ENCOUNTER (OUTPATIENT)
Dept: PHYSICAL THERAPY | Facility: HOSPITAL | Age: 75
Setting detail: THERAPIES SERIES
Discharge: HOME | End: 2025-04-30
Payer: MEDICARE

## 2025-04-30 DIAGNOSIS — M53.82: Primary | ICD-10-CM

## 2025-04-30 PROCEDURE — 97140 MANUAL THERAPY 1/> REGIONS: CPT

## 2025-04-30 NOTE — THERAPY TREATMENT NOTE
Physical Therapy Treatment Note  [facility]  A department [place] [address], [place] [address]    Patient: Mally De La Cruz                                                 Visit Date: 2025  :     1950    Referring practitioner:    Jane Reeder MD  Date of Initial Visit:          Type: THERAPY  Episode: cervicogenic dizziness  Number of visits this episode: 9    Visit Diagnoses:    ICD-10-CM ICD-9-CM   1. Dysfunction of cervical spine  M53.82 723.9     SUBJECTIVE     Subjective: She did her exercises. She does notice an improvement in the intensity and frequency of symptoms down to ~50% of the time.    PAIN: 5/10 neck       OBJECTIVE     Objective     Manual Therapy     62451  Comments   Cervical traction    CT UPA grade 2/3    STM cervical region grossly    Timed Minutes 45     Therapy Education/Self Care 71282   Education offered today HEP   Med[employer] Code [id number]   Ongoing HEP     Date: 2025  Prepared by: Carrol Clark    Exercises  - Seated Assisted Cervical Rotation with Towel  - 2 x daily - 4 x weekly - 10 reps - 5 sec hold  - Sidelying Open Book Thoracic Rotation with Knee on Foam Roll  - 1 x daily - 4 x weekly - 2-3 sets - 10 reps  - Cervical Extension AROM with Strap  - 4 x weekly - 2 sets - 10 reps - 5 sec hold  - Seated Cervical Retraction  - 1 x daily - 4 x weekly - 2-3 sets - 10 reps  - Standing Shoulder Row with Anchored Resistance  - 1 x daily - 4 x weekly - 2-3 sets - 10 reps   Timed Minutes        Total Timed Treatment:    45    mins  Total Time of Visit:            45    mins         ASSESSMENT/PLAN     Goals                                          Progress Note due by 2025                                                      Recert due by 2028   STG by: 6 weeks Comments Date Status   Patient will report a >/= 50% reduction in sensation of head fullness/fogginess  She doesn't have the dizziness but the head  fullness/fogginess has not changed  4/23  ongoing   Patient to improve NDI score to </=8  16 today  4/23  ongoing   Patient to report >/=50% improvement in cervical mm tightness and pain.    4/23  MET   LTG by: 12 weeks         Patient will report a 75% reduction in sensation of head fullness/fogginess  see above  4/23  ongoing   Patient to report >/=75% improvement in cervical mm tightness and pain.  see above  4/23  ongoing   Patient to improve cervical rotation AROM to >/=45 degrees bilaterally and pain-free.  22 L 30 R  4/23  ongoing   Patient to improve NDI score to </=4 See above  4/23  ongoing   Patient to improve cervical extension AROM to >/=45 degrees and pain-free.  32 deg  4/23  ongoing   Anticipated CPT codes: Gait Training 75404, Therapeutic Exercise 70227, Manual Therapy 14234, Therapeutic Activity 08011, Neuromuscular ReEducation 69118, Self Care/Home Management 86666, Estim Attended 40002, Estim Unattended 83908, Canalith Repositioning 65243, and Traction 76290    Assessment/Plan     ASSESSMENT: She has been consistently performing her HEP thus utilized clinic time for manual techniques.    PLAN: joint position sense, cervical manual, postural strengthening    SIGNATURE: Carrol Clark PT DPT, [place] License #: [id number]  Electronically Signed on 4/30/2025        Andrei Lawrence. [address]  [phone]

## 2025-05-05 ENCOUNTER — HOSPITAL ENCOUNTER (OUTPATIENT)
Dept: PHYSICAL THERAPY | Facility: HOSPITAL | Age: 75
Setting detail: THERAPIES SERIES
Discharge: HOME | End: 2025-05-05
Payer: MEDICARE

## 2025-05-05 DIAGNOSIS — M53.82: Primary | ICD-10-CM

## 2025-05-05 PROCEDURE — 97110 THERAPEUTIC EXERCISES: CPT

## 2025-05-05 PROCEDURE — 97140 MANUAL THERAPY 1/> REGIONS: CPT

## 2025-05-05 NOTE — THERAPY TREATMENT NOTE
Physical Therapy Treatment Note  [facility]patient Therapy [facility]  A department [place] [address], [place] [address]    Patient: Mally De La Cruz                                                 Visit Date: 2025  :     1950    Referring practitioner:    Jane Reeder MD  Date of Initial Visit:          Type: THERAPY  Episode: cervicogenic dizziness  Number of visits this episode: 10    Visit Diagnoses:    ICD-10-CM ICD-9-CM   1. Dysfunction of cervical spine  M53.82 723.9     SUBJECTIVE     Subjective: She is still performing HEP every other day.    PAIN: 5/10 neck       OBJECTIVE     Objective   Therapeutic Exercises    69108 Units Comments   Resisted cervical retraction 2*10 Yellow TB   Cerv ret + rot 2*5 B    UT, LS stretching 1 min e B    Timed Minutes 10       Manual Therapy     22874  Comments   Cervical traction    CT UPA grade 2/3    STM cervical region grossly    Timed Minutes 30     Therapy Education/Self Care 98429   Education offered today HEP   Med[employer] Code [email or web address]   Ongoing HEP     Date: 2025  Prepared by: Carrol Clark    Exercises  - Seated Assisted Cervical Rotation with Towel  - 2 x daily - 4 x weekly - 10 reps - 5 sec hold  - Sidelying Open Book Thoracic Rotation with Knee on Foam Roll  - 1 x daily - 4 x weekly - 2-3 sets - 10 reps  - Cervical Extension AROM with Strap  - 4 x weekly - 2 sets - 10 reps - 5 sec hold  - Seated Cervical Retraction  - 1 x daily - 4 x weekly - 2-3 sets - 10 reps  - Standing Shoulder Row with Anchored Resistance  - 1 x daily - 4 x weekly - 2-3 sets - 10 reps   Timed Minutes        Total Timed Treatment:    40    mins  Total Time of Visit:            40    mins         ASSESSMENT/PLAN     Goals                                          Progress Note due by 2025                                                      Recert due by 2028   STG by: 6 weeks Comments Date Status   Patient will report a >/= 50%  reduction in sensation of head fullness/fogginess  She doesn't have the dizziness but the head fullness/fogginess has not changed  4/23  ongoing   Patient to improve NDI score to </=8  16 today  4/23  ongoing   Patient to report >/=50% improvement in cervical mm tightness and pain.    4/23  MET   LTG by: 12 weeks         Patient will report a 75% reduction in sensation of head fullness/fogginess  see above  4/23  ongoing   Patient to report >/=75% improvement in cervical mm tightness and pain.  see above  4/23  ongoing   Patient to improve cervical rotation AROM to >/=45 degrees bilaterally and pain-free.  31 L 29 R  5/5  ongoing   Patient to improve NDI score to </=4 See above  4/23  ongoing   Patient to improve cervical extension AROM to >/=45 degrees and pain-free.  32 deg  4/23  ongoing   Anticipated CPT codes: Gait Training 41299, Therapeutic Exercise 96981, Manual Therapy 70817, Therapeutic Activity 01003, Neuromuscular ReEducation 83307, Self Care/Home Management 83858, Estim Attended 08507, Estim Unattended 08135, Canalith Repositioning 27610, and Traction 25371    Assessment/Plan     ASSESSMENT: Her cervical AROM was mildly improved today.    PLAN: joint position sense, cervical manual, postural strengthening    SIGNATURE: Carrol Clark PT DPT, [place] License #: [id number]  Electronically Signed on 5/5/2025        Andrei Lawrence. [address]  [phone]

## 2025-05-07 ENCOUNTER — HOSPITAL ENCOUNTER (OUTPATIENT)
Dept: PHYSICAL THERAPY | Facility: HOSPITAL | Age: 75
Setting detail: THERAPIES SERIES
Discharge: HOME | End: 2025-05-07
Payer: MEDICARE

## 2025-05-07 DIAGNOSIS — M53.82: Primary | ICD-10-CM

## 2025-05-07 PROCEDURE — 97112 NEUROMUSCULAR REEDUCATION: CPT

## 2025-05-07 PROCEDURE — 97140 MANUAL THERAPY 1/> REGIONS: CPT

## 2025-05-07 NOTE — THERAPY TREATMENT NOTE
Physical Therapy Treatment Note  [facility]  A department [place] [address], [place] [address]    Patient: Mally De La Cruz                                                 Visit Date: 2025  :     1950    Referring practitioner:    Jane Reeder MD  Date of Initial Visit:          Type: THERAPY  Episode: cervicogenic dizziness  Number of visits this episode: 11    Visit Diagnoses:    ICD-10-CM ICD-9-CM   1. Dysfunction of cervical spine  M53.82 723.9     SUBJECTIVE     Subjective: Nothing new to report.    PAIN: 5/10 neck       OBJECTIVE     Objective     Neuromuscular Reeducation     44087 Comments   Joint position sense     Timed Minutes 30       Manual Therapy     32338  Comments   Cervical traction    CT UPA grade 2/3    STM cervical region R    Timed Minutes 15     Therapy Education/Self Care 31051   Education offered today HEP   Med[employer] Code [email or web address]   Ongoing HEP     Date: 2025  Prepared by: Carrol Clark    Exercises  - Seated Assisted Cervical Rotation with Towel  - 2 x daily - 4 x weekly - 10 reps - 5 sec hold  - Sidelying Open Book Thoracic Rotation with Knee on Foam Roll  - 1 x daily - 4 x weekly - 2-3 sets - 10 reps  - Cervical Extension AROM with Strap  - 4 x weekly - 2 sets - 10 reps - 5 sec hold  - Seated Cervical Retraction  - 1 x daily - 4 x weekly - 2-3 sets - 10 reps  - Standing Shoulder Row with Anchored Resistance  - 1 x daily - 4 x weekly - 2-3 sets - 10 reps   Timed Minutes        Total Timed Treatment:    45    mins  Total Time of Visit:            45    mins         ASSESSMENT/PLAN     Goals                                          Progress Note due by 2025                                                      Recert due by 2028   STG by: 6 weeks Comments Date Status   Patient will report a >/= 50% reduction in sensation of head fullness/fogginess  She doesn't have the dizziness but the head  fullness/fogginess has not changed  4/23  ongoing   Patient to improve NDI score to </=8  16 today  4/23  ongoing   Patient to report >/=50% improvement in cervical mm tightness and pain.    4/23  MET   LTG by: 12 weeks         Patient will report a 75% reduction in sensation of head fullness/fogginess  see above  4/23  ongoing   Patient to report >/=75% improvement in cervical mm tightness and pain.  see above  4/23  ongoing   Patient to improve cervical rotation AROM to >/=45 degrees bilaterally and pain-free.  31 L 29 R  5/5  ongoing   Patient to improve NDI score to </=4 See above  4/23  ongoing   Patient to improve cervical extension AROM to >/=45 degrees and pain-free.  32 deg  4/23  ongoing   Anticipated CPT codes: Gait Training 81538, Therapeutic Exercise 34607, Manual Therapy 77396, Therapeutic Activity 60445, Neuromuscular ReEducation 57508, Self Care/Home Management 20958, Estim Attended 02430, Estim Unattended 14165, Canalith Repositioning 78779, and Traction 00475    Assessment/Plan     ASSESSMENT: She is becoming more accurate in her performance of joint position since activities.    PLAN: joint position sense, cervical manual, postural strengthening    SIGNATURE: Carrol Clark PT DPT, [place] License #: [id number]  Electronically Signed on 5/7/2025        Andrei Lawrence. [address]  [phone]

## 2025-05-12 ENCOUNTER — HOSPITAL ENCOUNTER (OUTPATIENT)
Dept: PHYSICAL THERAPY | Facility: HOSPITAL | Age: 75
Setting detail: THERAPIES SERIES
Discharge: HOME | End: 2025-05-12
Payer: MEDICARE

## 2025-05-12 DIAGNOSIS — M53.82: Primary | ICD-10-CM

## 2025-05-12 PROCEDURE — 97112 NEUROMUSCULAR REEDUCATION: CPT

## 2025-05-12 NOTE — THERAPY TREATMENT NOTE
Physical Therapy Treatment Note  [facility] Outpatient Therapy Services  A department [address], [place] [address]    Patient: Mally De La Cruz                                                 Visit Date: 2025  :     1950    Referring practitioner:    Jane Reeder MD  Date of Initial Visit:          Type: THERAPY  Episode: cervicogenic dizziness  Number of visits this episode: 12    Visit Diagnoses:    ICD-10-CM ICD-9-CM   1. Dysfunction of cervical spine  M53.82 723.9     SUBJECTIVE     Subjective: Her CC hasn't changed all that much since beginning PT. She still has to close one eye when turning to look when driving because it will cause double vision. Her BP was 119/69 mmHg.     PAIN: 5/10 neck       OBJECTIVE     Objective     Neuromuscular Reeducation     [id number] Comments   Habituation forward trunk lean looking at ground > return to upright look up at ceiling Minimally symptom provocative   Habituation lateral forearm prop with cervical rotation opposite Not symptom provocative   VOR 1/2 adaptation with optokinetic background slightly symptom provocative of CC   VOR 1/2 seated not symptom provocative   Head thrust: negative     Timed Minutes 40       Therapy Education/Self Care 65682   Education offered today HEP   [facility] Code [id number]; [id number]   Ongoing HEP     Date: 2025  Prepared by: Carrol Clark    Exercises  - Seated Assisted Cervical Rotation with Towel  - 2 x daily - 4 x weekly - 10 reps - 5 sec hold  - Sidelying Open Book Thoracic Rotation with Knee on Foam Roll  - 1 x daily - 4 x weekly - 2-3 sets - 10 reps  - Cervical Extension AROM with Strap  - 4 x weekly - 2 sets - 10 reps - 5 sec hold  - Seated Cervical Retraction  - 1 x daily - 4 x weekly - 2-3 sets - 10 reps  - Standing Shoulder Row with Anchored Resistance  - 1 x daily - 4 x weekly - 2-3 sets - 10 reps      Date: 2025  Prepared by: Carrol Campos  - Seated Gaze  Stabilization with Head Nod  - 1 x daily - 7 x weekly - 3 sets - 10 reps  - Seated Gaze Stabilization with Head Rotation  - 2-3 x daily - 7 x weekly - 2 sets - 2 reps - 30 seconds  - Seated Gaze Stabilization with Head Rotation and Horizontal Arm Movement  - 1 x daily - 7 x weekly - 3 sets - 10 reps  - Standing Gaze Stabilization with Head Nod and Vertical Arm Movement  - 1 x daily - 7 x weekly - 3 sets - 10 reps  - Narrow Stance Gaze Stabilization with Two Near Targets and Head Rotation  - 2-3 x daily - 7 x weekly - 2 sets - 2 reps - 30 seconds  - Narrow Stance Gaze Stabilization with Two Near Targets and Head Nod  - 1 x daily - 7 x weekly - 3 sets - 10 reps  - Imaginary Target with Head Nod  - 1 x daily - 7 x weekly - 3 sets - 10 reps  - Imaginary Target with Head Rotation  - 1 x daily - 7 x weekly - 3 sets - 10 reps   Timed Minutes        Total Timed Treatment:    40    mins  Total Time of Visit:            40    mins         ASSESSMENT/PLAN     Goals                                          Progress Note due by 5/23/2025                                                      Recert due by 6/23/2028   STG by: 6 weeks Comments Date Status   Patient will report a >/= 50% reduction in sensation of head fullness/fogginess  She doesn't have the dizziness but the head fullness/fogginess has not changed  4/23  ongoing   Patient to improve NDI score to </=8  16 today  4/23  ongoing   Patient to report >/=50% improvement in cervical mm tightness and pain.    4/23  MET   LTG by: 12 weeks         Patient will report a 75% reduction in sensation of head fullness/fogginess  see above  4/23  ongoing   Patient to report >/=75% improvement in cervical mm tightness and pain.  see above  4/23  ongoing   Patient to improve cervical rotation AROM to >/=45 degrees bilaterally and pain-free.  31 L 29 R  5/5  ongoing   Patient to improve NDI score to </=4 See above  4/23  ongoing   Patient to improve cervical extension AROM to >/=45  "degrees and pain-free.  32 deg  4/23  ongoing   Anticipated CPT codes: Gait Training 19379, Therapeutic Exercise 08146, Manual Therapy 85659, Therapeutic Activity 39055, Neuromuscular ReEducation 55951, Self Care/Home Management 32274, Estim Attended 26753, Estim Unattended 12066, Canalith Repositioning 22990, and Traction 26761    Assessment/Plan     ASSESSMENT: She notes minimal improvement in her CC, thus we are transitioning her to vestibular habituation and adaptation activities which will be the focus of the next several sessions. Bolstered her HEP to reflect. Should she not exhibit any improvement in her CC by the last scheduled visit with this change in POC, recommend returning to referring provider to determine next steps. Her DHI is 18 indicating \"dizziness\" is mild.    PLAN: joint position sense, cervical manual, postural strengthening    SIGNATURE: Carrol Clark PT DPT, [place] License #: [id number]  Electronically Signed on 5/12/2025        [place] [address]  [phone]    "

## 2025-05-14 ENCOUNTER — HOSPITAL ENCOUNTER (OUTPATIENT)
Dept: PHYSICAL THERAPY | Facility: HOSPITAL | Age: 75
Setting detail: THERAPIES SERIES
Discharge: HOME | End: 2025-05-14
Payer: MEDICARE

## 2025-05-14 DIAGNOSIS — M53.82: Primary | ICD-10-CM

## 2025-05-14 PROCEDURE — 97112 NEUROMUSCULAR REEDUCATION: CPT

## 2025-05-14 NOTE — THERAPY TREATMENT NOTE
Physical Therapy Treatment Note  [facility] Outpatient Therapy Services  A department [address], [place] [address]    Patient: Mally De La Cruz                                                 Visit Date: 2025  :     1950    Referring practitioner:    Jane Reeder MD  Date of Initial Visit:          Type: THERAPY  Episode: cervicogenic dizziness  Number of visits this episode: 13    Visit Diagnoses:    ICD-10-CM ICD-9-CM   1. Dysfunction of cervical spine  M53.82 723.9     SUBJECTIVE     Subjective:She denies dizziness and reports having some brain fog. Generally she has symptoms when in motion. Denies HA and reports neck and back pain      PAIN: 5/10         OBJECTIVE     Objective       Neuromuscular Reeducation     69855 Comments   Walking pushing computer with OMGDOG then same with vertical and horizontal head movement.    Walking pushing computer with roller coaster videos on screen    RS/NS EO/EC on static surface, airex, and theradisks    ML EO/EC on wobble board R<>L and A<>P then EO only on Fitter L1        Timed Minutes 40      Therapy Education/Self Care 70698   Education offered today    [facility] Code [id number]; [id number]    Ongoing HEP   Date: 2025  Prepared by: Carrol Clark     Exercises  - Seated Assisted Cervical Rotation with Towel  - 2 x daily - 4 x weekly - 10 reps - 5 sec hold  - Sidelying Open Book Thoracic Rotation with Knee on Foam Roll  - 1 x daily - 4 x weekly - 2-3 sets - 10 reps  - Cervical Extension AROM with Strap  - 4 x weekly - 2 sets - 10 reps - 5 sec hold  - Seated Cervical Retraction  - 1 x daily - 4 x weekly - 2-3 sets - 10 reps  - Standing Shoulder Row with Anchored Resistance  - 1 x daily - 4 x weekly - 2-3 sets - 10 reps        Date: 2025  Prepared by: Carrol Clark     Exercises  - Seated Gaze Stabilization with Head Nod  - 1 x daily - 7 x weekly - 3 sets - 10 reps  - Seated Gaze Stabilization with Head Rotation   - 2-3 x daily - 7 x weekly - 2 sets - 2 reps - 30 seconds  - Seated Gaze Stabilization with Head Rotation and Horizontal Arm Movement  - 1 x daily - 7 x weekly - 3 sets - 10 reps  - Standing Gaze Stabilization with Head Nod and Vertical Arm Movement  - 1 x daily - 7 x weekly - 3 sets - 10 reps  - Narrow Stance Gaze Stabilization with Two Near Targets and Head Rotation  - 2-3 x daily - 7 x weekly - 2 sets - 2 reps - 30 seconds  - Narrow Stance Gaze Stabilization with Two Near Targets and Head Nod  - 1 x daily - 7 x weekly - 3 sets - 10 reps  - Imaginary Target with Head Nod  - 1 x daily - 7 x weekly - 3 sets - 10 reps  - Imaginary Target with Head Rotation  - 1 x daily - 7 x weekly - 3 sets - 10 reps   Timed Minutes        Total Timed Treatment:     40   mins  Total Time of Visit:             40   mins         ASSESSMENT/PLAN     GOALS  Goals                                          Progress Note due by 5/23/2025                                                      Recert due by 6/23/2028   STG by: 6 weeks Comments Date Status   Patient will report a >/= 50% reduction in sensation of head fullness/fogginess  She doesn't have the dizziness but the head fullness/fogginess has not changed  4/23  ongoing   Patient to improve NDI score to </=8  16 today  4/23  ongoing   Patient to report >/=50% improvement in cervical mm tightness and pain.    4/23  MET   LTG by: 12 weeks         Patient will report a 75% reduction in sensation of head fullness/fogginess  still reporting issues  5/15  ongoing   Patient to report >/=75% improvement in cervical mm tightness and pain.  see above  4/23  ongoing   Patient to improve cervical rotation AROM to >/=45 degrees bilaterally and pain-free.  31 L 29 R  5/5  ongoing   Patient to improve NDI score to </=4 See above  4/23  ongoing   Patient to improve cervical extension AROM to >/=45 degrees and pain-free.  32 deg  4/23  ongoing     Anticipated CPT codes: Gait Training 06178, Therapeutic  Exercise 39535, Manual Therapy 51434, Therapeutic Activity 37050, Neuromuscular ReEducation 54101, Self Care/Home Management 92283, Estim Attended 10111, and Estim Unattended 48845      Assessment/Plan     ASSESSMENT:   She did well the utilization of videos proved helpful today. She was unable to maintain ML on wobble board and Fitter with EC today    PLAN:   Continue to challenge her vestibular system and work to improve postural control.    SIGNATURE: [facility], [place] License #: [id number]  Electronically Signed on 5/14/2025        [address] [place] [address]. [address]  [phone]

## 2025-05-19 ENCOUNTER — HOSPITAL ENCOUNTER (OUTPATIENT)
Dept: PHYSICAL THERAPY | Facility: HOSPITAL | Age: 75
Setting detail: THERAPIES SERIES
Discharge: HOME | End: 2025-05-19
Payer: MEDICARE

## 2025-05-19 DIAGNOSIS — M53.82: Primary | ICD-10-CM

## 2025-05-19 PROCEDURE — 97112 NEUROMUSCULAR REEDUCATION: CPT

## 2025-05-19 NOTE — THERAPY TREATMENT NOTE
Physical Therapy Treatment Note  [facility] Outpatient Therapy Services  A department [address], [place] [address]    Patient: Mally De La Cruz                                                 Visit Date: 2025  :     1950    Referring practitioner:    Jane Reeder MD  Date of Initial Visit:          Type: THERAPY  Episode: cervicogenic dizziness  Number of visits this episode: 14    Visit Diagnoses:    ICD-10-CM ICD-9-CM   1. Dysfunction of cervical spine  M53.82 723.9     SUBJECTIVE     Subjective: She woke up with the floating/full feeling this morning and had it after last session.     PAIN: 6/10 neck         OBJECTIVE     Objective       Neuromuscular Reeducation     84460 Comments   Two target VOR fitter board    Walking pushing computer with VOR 1/VOR 2 optokinetic training Greatest increase to 4/10   Walking forward VOR 1/2 ball busy background Greatest increase to 4/10   Habituation forward and backward target location    Timed Minutes 45      Therapy Education/Self Care 29523   Education offered today    [facility] Code [id number]; [id number]    Ongoing HEP   Date: 2025  Prepared by: Carrol Clark     Exercises  - Seated Assisted Cervical Rotation with Towel  - 2 x daily - 4 x weekly - 10 reps - 5 sec hold  - Sidelying Open Book Thoracic Rotation with Knee on Foam Roll  - 1 x daily - 4 x weekly - 2-3 sets - 10 reps  - Cervical Extension AROM with Strap  - 4 x weekly - 2 sets - 10 reps - 5 sec hold  - Seated Cervical Retraction  - 1 x daily - 4 x weekly - 2-3 sets - 10 reps  - Standing Shoulder Row with Anchored Resistance  - 1 x daily - 4 x weekly - 2-3 sets - 10 reps        Date: 2025  Prepared by: Carrol Clark     Exercises  - Seated Gaze Stabilization with Head Nod  - 1 x daily - 7 x weekly - 3 sets - 10 reps  - Seated Gaze Stabilization with Head Rotation  - 2-3 x daily - 7 x weekly - 2 sets - 2 reps - 30 seconds  - Seated Gaze Stabilization  with Head Rotation and Horizontal Arm Movement  - 1 x daily - 7 x weekly - 3 sets - 10 reps  - Standing Gaze Stabilization with Head Nod and Vertical Arm Movement  - 1 x daily - 7 x weekly - 3 sets - 10 reps  - Narrow Stance Gaze Stabilization with Two Near Targets and Head Rotation  - 2-3 x daily - 7 x weekly - 2 sets - 2 reps - 30 seconds  - Narrow Stance Gaze Stabilization with Two Near Targets and Head Nod  - 1 x daily - 7 x weekly - 3 sets - 10 reps  - Imaginary Target with Head Nod  - 1 x daily - 7 x weekly - 3 sets - 10 reps  - Imaginary Target with Head Rotation  - 1 x daily - 7 x weekly - 3 sets - 10 reps   Timed Minutes        Total Timed Treatment:     45   mins  Total Time of Visit:             45   mins         ASSESSMENT/PLAN     GOALS  Goals                                          Progress Note due by 5/23/2025                                                      Recert due by 6/23/2028   STG by: 6 weeks Comments Date Status   Patient will report a >/= 50% reduction in sensation of head fullness/fogginess  She doesn't have the dizziness but the head fullness/fogginess has not changed  4/23  ongoing   Patient to improve NDI score to </=8  16 today  4/23  ongoing   Patient to report >/=50% improvement in cervical mm tightness and pain.    4/23  MET   LTG by: 12 weeks         Patient will report a 75% reduction in sensation of head fullness/fogginess  still reporting issues  5/15  ongoing   Patient to report >/=75% improvement in cervical mm tightness and pain.  see above  4/23  ongoing   Patient to improve cervical rotation AROM to >/=45 degrees bilaterally and pain-free.  31 L 29 R  5/5  ongoing   Patient to improve NDI score to </=4 See above  4/23  ongoing   Patient to improve cervical extension AROM to >/=45 degrees and pain-free.  32 deg  4/23  ongoing     Anticipated CPT codes: Gait Training 32361, Therapeutic Exercise 95684, Manual Therapy 86729, Therapeutic Activity 72402, Neuromuscular  ReEducation 51029, Self Care/Home Management 53785, Estim Attended 19505, and Estim Unattended 16811      Assessment/Plan     ASSESSMENT: She was most symptomatic with VOR 2 in the horizontal plane.     PLAN: Continue to challenge her vestibular system and work to improve postural control.    SIGNATURE: Carrol Clark PT DPT, [place] License #: [id number]  Electronically Signed on 5/19/2025        [place] [address]  [phone]

## 2025-05-21 ENCOUNTER — HOSPITAL ENCOUNTER (OUTPATIENT)
Dept: PHYSICAL THERAPY | Facility: HOSPITAL | Age: 75
Setting detail: THERAPIES SERIES
Discharge: HOME | End: 2025-05-21
Payer: MEDICARE

## 2025-05-21 DIAGNOSIS — M53.82: Primary | ICD-10-CM

## 2025-05-21 PROCEDURE — 97112 NEUROMUSCULAR REEDUCATION: CPT

## 2025-05-21 NOTE — THERAPY TREATMENT NOTE
Physical Therapy Treatment Note  [facility] Outpatient Therapy Services  A department [address], [place] [address]    Patient: Mally De La Cruz                                                 Visit Date: 2025  :     1950    Referring practitioner:    Jane Reeder MD  Date of Initial Visit:          Type: THERAPY  Episode: cervicogenic dizziness  Number of visits this episode: 15    Visit Diagnoses:    ICD-10-CM ICD-9-CM   1. Dysfunction of cervical spine  M53.82 723.9     SUBJECTIVE     Subjective: She did her exercises and stood up and felt that same wave between her eyes.    PAIN: 4-5/10        OBJECTIVE     Objective       Neuromuscular Reeducation     [id number] Comments   On swing, on trampoline VOR 1/2 adaptation H/V optokinetic training  Caused HA but no worsening of fullness/fogginess feeling w/ vertical but increase to 2/10 horizontal VOR 2, 3/10 increase VOR 1 H no increase VOR 1 V   Habituation forward middle backward target finding 10 laps 3/10 increase   Timed Minutes 45      Therapy Education/Self Care 89758   Education offered today    [facility] Code [id number]; [id number]    Ongoing HEP   Date: 2025  Prepared by: Carrol Clark     Exercises  - Seated Assisted Cervical Rotation with Towel  - 2 x daily - 4 x weekly - 10 reps - 5 sec hold  - Sidelying Open Book Thoracic Rotation with Knee on Foam Roll  - 1 x daily - 4 x weekly - 2-3 sets - 10 reps  - Cervical Extension AROM with Strap  - 4 x weekly - 2 sets - 10 reps - 5 sec hold  - Seated Cervical Retraction  - 1 x daily - 4 x weekly - 2-3 sets - 10 reps  - Standing Shoulder Row with Anchored Resistance  - 1 x daily - 4 x weekly - 2-3 sets - 10 reps        Date: 2025  Prepared by: Carrol Clark     Exercises  - Seated Gaze Stabilization with Head Nod  - 1 x daily - 7 x weekly - 3 sets - 10 reps  - Seated Gaze Stabilization with Head Rotation  - 2-3 x daily - 7 x weekly - 2 sets - 2 reps - 30  seconds  - Seated Gaze Stabilization with Head Rotation and Horizontal Arm Movement  - 1 x daily - 7 x weekly - 3 sets - 10 reps  - Standing Gaze Stabilization with Head Nod and Vertical Arm Movement  - 1 x daily - 7 x weekly - 3 sets - 10 reps  - Narrow Stance Gaze Stabilization with Two Near Targets and Head Rotation  - 2-3 x daily - 7 x weekly - 2 sets - 2 reps - 30 seconds  - Narrow Stance Gaze Stabilization with Two Near Targets and Head Nod  - 1 x daily - 7 x weekly - 3 sets - 10 reps  - Imaginary Target with Head Nod  - 1 x daily - 7 x weekly - 3 sets - 10 reps  - Imaginary Target with Head Rotation  - 1 x daily - 7 x weekly - 3 sets - 10 reps   Timed Minutes        Total Timed Treatment:     45   mins  Total Time of Visit:             45   mins         ASSESSMENT/PLAN     GOALS  Goals                                          Progress Note due by 5/23/2025                                                      Recert due by 6/23/2028   STG by: 6 weeks Comments Date Status   Patient will report a >/= 50% reduction in sensation of head fullness/fogginess  She doesn't have the dizziness but the head fullness/fogginess has not changed  4/23  ongoing   Patient to improve NDI score to </=8  16 today  4/23  ongoing   Patient to report >/=50% improvement in cervical mm tightness and pain.    4/23  MET   LTG by: 12 weeks         Patient will report a 75% reduction in sensation of head fullness/fogginess  still reporting issues  5/15  ongoing   Patient to report >/=75% improvement in cervical mm tightness and pain.  see above  4/23  ongoing   Patient to improve cervical rotation AROM to >/=45 degrees bilaterally and pain-free.  31 L 29 R  5/5  ongoing   Patient to improve NDI score to </=4 See above  4/23  ongoing   Patient to improve cervical extension AROM to >/=45 degrees and pain-free.  32 deg  4/23  ongoing     Anticipated CPT codes: Gait Training 06459, Therapeutic Exercise 10573, Manual Therapy 59156, Therapeutic  Activity 66994, Neuromuscular ReEducation 03217, Self Care/Home Management 14337, Estim Attended 98690, and Estim Unattended 24576      Assessment/Plan     ASSESSMENT: She was most symptomatic with VOR 2 in the horizontal plane with translatory stimulation today.    PLAN: Continue to challenge her vestibular system and work to improve postural control.    SIGNATURE: Carrol Clark PT DPT, [place] License #: [id number]  Electronically Signed on 5/21/2025        [place] [address]  [phone]

## 2025-05-28 ENCOUNTER — HOSPITAL ENCOUNTER (OUTPATIENT)
Dept: PHYSICAL THERAPY | Facility: HOSPITAL | Age: 75
Setting detail: THERAPIES SERIES
Discharge: HOME | End: 2025-05-28
Payer: MEDICARE

## 2025-05-28 ENCOUNTER — TELEPHONE (OUTPATIENT)
Dept: NEUROLOGY | Facility: CLINIC | Age: 75
End: 2025-05-28
Payer: MEDICARE

## 2025-05-28 DIAGNOSIS — M53.82: Primary | ICD-10-CM

## 2025-05-28 PROCEDURE — 97112 NEUROMUSCULAR REEDUCATION: CPT

## 2025-05-28 NOTE — THERAPY TREATMENT NOTE
Physical Therapy Treatment Note, 30 Day Progress Note, and Discharge Note  [facility]patient Therapy Services  A department of [address], [place] [address]    Patient: Mally De La Cruz                                                 Visit Date: 2025  :     1950    Referring practitioner:    Jane Reeder MD  Date of Initial Visit:          Type: THERAPY  Episode: cervicogenic dizziness  Number of visits this episode: 16    Visit Diagnoses:    ICD-10-CM ICD-9-CM   1. Dysfunction of cervical spine  M53.82 723.9     SUBJECTIVE     Subjective: She reports a little more fogginess since last visit. She had increase in fogginess and HA onset that lasted around 20-30 mins after last session.     PAIN: 6/10   FOGGINESS/FULLNESS: 3/10       OBJECTIVE     Objective       Neuromuscular Reeducation     [id number] Comments   VOR 1 adaptation H/V optokinetic training on COW walking forward Greatest increase from 3/10 to 410 H   Habituation forward and backward lean <> ML locating target No increase   PN    Habituation seated lateral forearm prop <> ML locating target No increase   Timed Minutes 38      Therapy Education/Self Care [id number]   Education offered today    [facility] Code [id number]; [id number]    Ongoing HEP   Date: 2025  Prepared by: Carrol Clark     Exercises  - Seated Assisted Cervical Rotation with Towel  - 2 x daily - 4 x weekly - 10 reps - 5 sec hold  - Sidelying Open Book Thoracic Rotation with Knee on Foam Roll  - 1 x daily - 4 x weekly - 2-3 sets - 10 reps  - Cervical Extension AROM with Strap  - 4 x weekly - 2 sets - 10 reps - 5 sec hold  - Seated Cervical Retraction  - 1 x daily - 4 x weekly - 2-3 sets - 10 reps  - Standing Shoulder Row with Anchored Resistance  - 1 x daily - 4 x weekly - 2-3 sets - 10 reps        Date: 2025  Prepared by: Carrol Clark     Exercises  - Seated Gaze Stabilization with Head Nod  - 1 x daily - 7 x weekly - 3 sets - 10  reps  - Seated Gaze Stabilization with Head Rotation  - 2-3 x daily - 7 x weekly - 2 sets - 2 reps - 30 seconds  - Seated Gaze Stabilization with Head Rotation and Horizontal Arm Movement  - 1 x daily - 7 x weekly - 3 sets - 10 reps  - Standing Gaze Stabilization with Head Nod and Vertical Arm Movement  - 1 x daily - 7 x weekly - 3 sets - 10 reps  - Narrow Stance Gaze Stabilization with Two Near Targets and Head Rotation  - 2-3 x daily - 7 x weekly - 2 sets - 2 reps - 30 seconds  - Narrow Stance Gaze Stabilization with Two Near Targets and Head Nod  - 1 x daily - 7 x weekly - 3 sets - 10 reps  - Imaginary Target with Head Nod  - 1 x daily - 7 x weekly - 3 sets - 10 reps  - Imaginary Target with Head Rotation  - 1 x daily - 7 x weekly - 3 sets - 10 reps   Timed Minutes        Total Timed Treatment:     38   mins  Total Time of Visit:             38   mins         ASSESSMENT/PLAN     GOALS  Goals                                          Progress Note due by 6/27/2025                                                      Recert due by 6/23/2028   STG by: 6 weeks Comments Date Status   Patient will report a >/= 50% reduction in sensation of head fullness/fogginess head fullness/fogginess has not changed  5/28  NOT MET   Patient to improve NDI score to </=8  12 today  5/28  NOT MET   Patient to report >/=50% improvement in cervical mm tightness and pain.    4/23  MET   LTG by: 12 weeks         Patient will report a 75% reduction in sensation of head fullness/fogginess  still reporting issues  5/28  NOT MET   Patient to report >/=75% improvement in cervical mm tightness and pain.  see above  5/28  NOT MET   Patient to improve cervical rotation AROM to >/=45 degrees bilaterally and pain-free.  24 L 33 R  5/28  NOT MET   Patient to improve NDI score to </=4 See above  5/28  NOT MET   Patient to improve cervical extension AROM to >/=45 degrees and pain-free.  30 deg  5/28  NOT MET     Anticipated CPT codes: Gait Training  57899, Therapeutic Exercise 93779, Manual Therapy 37519, Therapeutic Activity 08120, Neuromuscular ReEducation 51643, Self Care/Home Management 54744, Estim Attended 28495, and Estim Unattended 73954      Assessment & Plan       Assessment  Impairments: abnormal muscle tone, abnormal or restricted ROM, activity intolerance, impaired physical strength, lacks appropriate home exercise program and pain with function   Functional limitations: carrying objects, lifting, sleeping, pulling, pushing, uncomfortable because of pain, moving in bed and unable to perform repetitive tasks   Prognosis: good    Plan  Therapy options: will be seen for skilled therapy services  Planned modality interventions: cryotherapy, dry needling, electrical stimulation/Russian stimulation, TENS, low level laser therapy, thermotherapy (hydrocollator packs), traction and ultrasound  Planned therapy interventions: manual therapy, motor coordination training, ADL retraining, balance/weight-bearing training, neuromuscular re-education, body mechanics training, postural training, fine motor coordination training, soft tissue mobilization, spinal/joint mobilization, flexibility, functional ROM exercises, strengthening, stretching, home exercise program, therapeutic activities, IADL retraining and joint mobilization  Treatment plan discussed with: patient         ASSESSMENT: Progress note performed per POC. She has not experienced any improvement in her CC of fullness/fogginess since beginning her PT POC on 3/25/2025. Initially trialed techniques for potential cervicogenic dizziness contribution and transitioned to vestibular habituation and adaptation techniques for potential hypofunction component neither of which made any difference in s/s. Advised her to continue with adaptation HEP until f/u with neurology. She is D/C at this time.    PLAN: D/C    SIGNATURE: Carrol Clark, PT DPT, [place] License #: [id number]  Electronically Signed on  5/28/2025        [address] [place] [address]. [id number]  [phone]

## 2025-05-30 ENCOUNTER — PATIENT ROUNDING (BHMG ONLY) (OUTPATIENT)
Dept: NEUROLOGY | Facility: CLINIC | Age: 75
End: 2025-05-30
Payer: MEDICARE

## 2025-05-30 ENCOUNTER — OFFICE VISIT (OUTPATIENT)
Dept: NEUROLOGY | Facility: CLINIC | Age: 75
End: 2025-05-30
Payer: MEDICARE

## 2025-05-30 VITALS
WEIGHT: 160.6 LBS | SYSTOLIC BLOOD PRESSURE: 110 MMHG | HEART RATE: 68 BPM | RESPIRATION RATE: 12 BRPM | DIASTOLIC BLOOD PRESSURE: 74 MMHG | HEIGHT: 60 IN | BODY MASS INDEX: 31.53 KG/M2

## 2025-05-30 DIAGNOSIS — G62.9 SMALL FIBER NEUROPATHY: Primary | ICD-10-CM

## 2025-05-30 PROCEDURE — 99213 OFFICE O/P EST LOW 20 MIN: CPT | Performed by: PSYCHIATRY & NEUROLOGY

## 2025-05-30 PROCEDURE — 3078F DIAST BP <80 MM HG: CPT | Performed by: PSYCHIATRY & NEUROLOGY

## 2025-05-30 PROCEDURE — 3074F SYST BP LT 130 MM HG: CPT | Performed by: PSYCHIATRY & NEUROLOGY

## 2025-05-30 NOTE — PROGRESS NOTES
May 30, 2025    Hello, may I speak with Mally De La Cruz?    My name is Demetria      I am  with [facility]  [address]  [address]  [place] [address] [phone].    Before we get started may I verify your date of birth? 1950    I am calling to ask about your recent visit. Is this a good time to talk? yes    Tell me about your visit with us. What things went well?  everything went well and everyone is really nice       We're always looking for ways to make our patients' experiences even better. Do you have recommendations on ways we may improve?  no    Overall were you satisfied with your visit to our practice? yes       I appreciate you taking the time to speak with me today. Is there anything else I can do for you? no      Thank you, and have a great day.

## 2025-05-30 NOTE — PROGRESS NOTES
"  Subjective        Mally De La Cruz presents to [facility]    History of Present Illness        75-year-old female here for follow-up of neuropathy.  We did a trial of duloxetine but she had side effects to this.  She tried capsaicin but did not have clear benefit.          Current Outpatient Medications:     acetaminophen (TYLENOL) 500 MG tablet, Take 1 tablet by mouth As Needed., Disp: , Rfl:     ascorbic acid (VITAMIN C) 1000 MG tablet, Take 1 tablet by mouth Daily., Disp: , Rfl:     atorvastatin (LIPITOR) 20 MG tablet, Take 2 tablets by mouth Daily., Disp: 180 tablet, Rfl: 3    Cholecalciferol (VITAMIN D) 2000 units capsule, Take 1 capsule by mouth Daily., Disp: , Rfl:     docusate sodium (COLACE) 100 MG capsule, Take 1 capsule by mouth As Needed., Disp: , Rfl:     hydroCHLOROthiazide 12.5 MG tablet, Take 1 tablet by mouth Daily., Disp: 90 tablet, Rfl: 3    Multiple Vitamins-Minerals (PRESERVISION AREDS 2+MULTI VIT PO), Take 1 tablet by mouth Daily., Disp: , Rfl:     omeprazole (priLOSEC) 20 MG capsule, Take 1 capsule by mouth Daily., Disp: , Rfl:     Polyethyl Glycol-Propyl Glycol (SYSTANE OP), Apply  to eye(s) as directed by provider., Disp: , Rfl:     Wheat Dextrin (BENEFIBER PO), Take  by mouth., Disp: , Rfl:     valACYclovir (VALTREX) 500 MG tablet, Take 1 tablet by mouth Daily. (Patient not taking: Reported on 5/30/2025), Disp: , Rfl:        Objective   Vital Signs:   /74 (BP Location: Left arm, Patient Position: Sitting)   Pulse 68   Resp 12   Ht 152.4 cm (60\")   Wt 72.8 kg (160 lb 9.6 oz)   BMI 31.37 kg/m²     Physical Exam  Constitutional:       General: She is awake.   Eyes:      Extraocular Movements: Extraocular movements intact.   Neurological:      Mental Status: She is alert.   Psychiatric:         Speech: Speech normal.        Neurological Exam  Mental Status  Awake and alert. Speech is normal. Language is fluent with no aphasia.    Cranial Nerves  CN " "III, IV, VI: Extraocular movements intact bilaterally.  CN VII: Full and symmetric facial movement.    Gait  Casual gait is normal including stance, stride, and arm swing.      Result Review :            Results                 Assessment and Plan     Assessment & Plan  75-year-old female with HTN, HLD, preDM referred for burning in feet.  Likely consistent with small fiber polyneuropathy.  May be idiopathic.Had EMG which was normal in August 2024.  Has previously tried and failed gabapentin and Lyrica.  B12, folate, TSH normal.  A1c 5.7.    She reports having a \"foggy\" brain, which she decribes as a sensation of motion. She has seen ENT for this before.  Did vestibular rehab without benefit.  Had side effects to duloxetine.  No benefit with capsaicin.  Further laboratory workup was negative.    Follow-up as needed      Follow Up   No follow-ups on file.  Patient was given instructions and counseling regarding her condition or for health maintenance advice. Please see specific information pulled into the AVS if appropriate.   "

## 2025-06-02 ENCOUNTER — OFFICE VISIT (OUTPATIENT)
Dept: INTERNAL MEDICINE | Facility: CLINIC | Age: 75
End: 2025-06-02
Payer: MEDICARE

## 2025-06-02 VITALS
HEIGHT: 60 IN | WEIGHT: 161 LBS | BODY MASS INDEX: 31.61 KG/M2 | OXYGEN SATURATION: 98 % | HEART RATE: 72 BPM | TEMPERATURE: 96.7 F | DIASTOLIC BLOOD PRESSURE: 74 MMHG | SYSTOLIC BLOOD PRESSURE: 132 MMHG

## 2025-06-02 DIAGNOSIS — M25.532 LEFT WRIST PAIN: ICD-10-CM

## 2025-06-02 DIAGNOSIS — E78.2 MIXED HYPERLIPIDEMIA: ICD-10-CM

## 2025-06-02 DIAGNOSIS — R09.89 BILATERAL CAROTID BRUITS: ICD-10-CM

## 2025-06-02 DIAGNOSIS — R73.03 PREDIABETES: Primary | ICD-10-CM

## 2025-06-02 DIAGNOSIS — I10 ESSENTIAL HYPERTENSION: ICD-10-CM

## 2025-06-02 DIAGNOSIS — M79.89 LEG SWELLING: ICD-10-CM

## 2025-06-02 DIAGNOSIS — R01.1 SYSTOLIC MURMUR: ICD-10-CM

## 2025-06-02 LAB
EXPIRATION DATE: ABNORMAL
HBA1C MFR BLD: 5.9 % (ref 4.5–5.7)
Lab: ABNORMAL

## 2025-06-02 NOTE — PROGRESS NOTES
"    Chief Complaint  Follow-up (3 month follow up.  Would like to discuss if she should continue taking Valtrex. ), Prediabetes (A1C 5.9), Leg Swelling (Bilateral leg swelling. ), Wrist Pain (Left wrist pain that comes and goes x 1 year), and Hypertension    Subjective        Mally De La Cruz presents to [facility]  Leg Swelling  Wrist Pain     Hypertension  See below.     Objective   Vital Signs:  /74   Pulse 72   Temp 96.7 °F (35.9 °C) (Temporal)   Ht 152.4 cm (60\")   Wt 73 kg (161 lb)   SpO2 98%   BMI 31.44 kg/m²   Estimated body mass index is 31.44 kg/m² as calculated from the following:    Height as of this encounter: 152.4 cm (60\").    Weight as of this encounter: 73 kg (161 lb).         Physical Exam  Constitutional:       General: She is not in acute distress.     Appearance: She is not ill-appearing.   HENT:      Head: Normocephalic and atraumatic.   Eyes:      Conjunctiva/sclera: Conjunctivae normal.      Pupils: Pupils are equal, round, and reactive to light.   Neck:      Comments: Perhaps faint carotid bruit.   Cardiovascular:      Rate and Rhythm: Normal rate and regular rhythm.      Heart sounds: Murmur (faint kartik) heard.   Pulmonary:      Effort: Pulmonary effort is normal. No respiratory distress.      Breath sounds: Normal breath sounds.   Musculoskeletal:         General: Swelling (perhaps trace, nothing pitting) present.   Skin:     General: Skin is warm and dry.      Findings: No rash.   Neurological:      General: No focal deficit present.      Mental Status: She is alert and oriented to person, place, and time.   Psychiatric:         Mood and Affect: Mood normal.         Behavior: Behavior normal.         Thought Content: Thought content normal.         Judgment: Judgment normal.        Result Review :  Reviewed her most recent labs from November 2024.         Assessment and Plan   Diagnoses and all orders for this visit:    1. Prediabetes " "(Primary)  -     POC Glycated Hemoglobin, Total  -     Hemoglobin A1c; Future    2. Essential hypertension  -     CBC & Differential; Future  -     Comprehensive metabolic panel; Future  -     TSH Rfx On Abnormal To Free T4; Future    3. Leg swelling    4. Mixed hyperlipidemia  -     Lipid Panel; Future    5. Systolic murmur  -     Adult Transthoracic Echo Complete W/ Cont if Necessary Per Protocol; Future    6. Bilateral carotid bruits  -     US Carotid Bilateral; Future    7. Left wrist pain       Presents today for follow-up.    Hemoglobin A1c is 5.9 today. It was 5.7 in November.  Continue to monitor starches and sugars.    Blood pressure is 132/74.  Currently on HCTZ monotherapy.  She was removed from ACE/ARB in the past due to cough and then also orthostasis.    She tolerates atorvastatin for cholesterol.  Her last lipid panel was in November with no issues.  Her last LFTs were in November with no issues.  Plan to repeat the studies prior to her next appointment.     She has been having some leg swelling in her opinion.  She has trace swelling in the office today.  She has compression stockings that were fitted to her personally, but she does not often wear them.  She finds them uncomfortable.  She is not having any other signs or symptoms of CHF.  She had no protein in her urine in November.  She does not take nonsteroidals on a regular basis.    Dr. Reeder just saw her at the end of last week.  Her note is incomplete.  When she saw her in March, she trialed her on Cymbalta and also made a referral to vestibular rehab.  The patient ended up stopping Cymbalta due to side effects (clearly dizzy).  She has also not done well with gabapentin or pregabalin previously.  There was no benefit with capsaicin.  She also felt like she had no benefit with vestibular rehab. Continues to be \"foggy.\"     She may have a faint carotid bruit.  She may have a faint systolic murmur.  She denies feeling like her \"head is bobbing.\"  " Feel with some of her complaints coupled with the leg swelling that getting an echocardiogram and carotid duplex examinations would be reasonable.  She is not very clear on her family history as she is adopted.    She stopped her Valtrex suppression in early May.  She has been on this since 2016.  It was initially suggested by Dr. Ang.  She will self monitor.    She briefly pointed out some pain in her left wrist.  She feels like this is due to overuse of holding her phone while scrolling.  She just wanted to mention it.  She will try to not do that so often or choose different positions of holding her phone.  We will keep an eye on that.    She will return in November for her annual medical wellness visit.  She can reach out sooner if problems.  I will pend the labs to be done prior to her next appointment.  I will reach out to her with results of her echocardiogram and carotid duplex examinations when they are available.  She will do these when she gets back from [place].  She goes on that trip next week.      Follow Up   Return in about 6 months (around 12/2/2025) for Annual Medicare Wellness Visit.  Patient was given instructions and counseling regarding her condition or for health maintenance advice. Please see specific information pulled into the AVS if appropriate.      KARYN Osorio DO       Electronically signed by RONNY Osorio DO, 06/02/25, 1:35 PM CDT.

## 2025-06-25 ENCOUNTER — TELEPHONE (OUTPATIENT)
Dept: INTERNAL MEDICINE | Facility: CLINIC | Age: 75
End: 2025-06-25

## 2025-06-25 NOTE — TELEPHONE ENCOUNTER
"  Caller: Mally De La Cruz \"Carmen\"    Relationship: Self    Best call back number: [phone]     What is the best time to reach you: ANYTIME    Who are you requesting to speak with (clinical staff, provider,  specific staff member): CLINICAL    What was the call regarding: PATIENT IS NEEDING TO SPEAK TO A NURSE IN REGARD TO A BOWEL ISSUE SHE IS HAVING. SHE STATED IT'S BEEN A WEEK.    PLEASE CALL TO DISCUSS AND ADVISE.   "

## 2025-06-26 ENCOUNTER — OFFICE VISIT (OUTPATIENT)
Dept: INTERNAL MEDICINE | Facility: CLINIC | Age: 75
End: 2025-06-26
Payer: MEDICARE

## 2025-06-26 VITALS
SYSTOLIC BLOOD PRESSURE: 144 MMHG | RESPIRATION RATE: 18 BRPM | BODY MASS INDEX: 31.41 KG/M2 | DIASTOLIC BLOOD PRESSURE: 80 MMHG | TEMPERATURE: 98.2 F | WEIGHT: 160 LBS | HEART RATE: 74 BPM | OXYGEN SATURATION: 97 % | HEIGHT: 60 IN

## 2025-06-26 DIAGNOSIS — Z78.9 HISTORY OF RECENT FOREIGN TRAVEL: ICD-10-CM

## 2025-06-26 DIAGNOSIS — R19.7 DIARRHEA OF PRESUMED INFECTIOUS ORIGIN: Primary | ICD-10-CM

## 2025-06-26 DIAGNOSIS — D64.89 ANEMIA DUE TO OTHER CAUSE, NOT CLASSIFIED: ICD-10-CM

## 2025-06-26 DIAGNOSIS — R05.1 ACUTE COUGH: ICD-10-CM

## 2025-06-26 DIAGNOSIS — J06.9 UPPER RESPIRATORY TRACT INFECTION, UNSPECIFIED TYPE: ICD-10-CM

## 2025-06-26 LAB
EXPIRATION DATE: NORMAL
FLUAV AG UPPER RESP QL IA.RAPID: NOT DETECTED
FLUBV AG UPPER RESP QL IA.RAPID: NOT DETECTED
INTERNAL CONTROL: NORMAL
Lab: NORMAL
SARS-COV-2 AG UPPER RESP QL IA.RAPID: NOT DETECTED

## 2025-06-26 PROCEDURE — 99214 OFFICE O/P EST MOD 30 MIN: CPT | Performed by: NURSE PRACTITIONER

## 2025-06-26 PROCEDURE — 1125F AMNT PAIN NOTED PAIN PRSNT: CPT | Performed by: NURSE PRACTITIONER

## 2025-06-26 PROCEDURE — 87428 SARSCOV & INF VIR A&B AG IA: CPT | Performed by: NURSE PRACTITIONER

## 2025-06-26 PROCEDURE — 3079F DIAST BP 80-89 MM HG: CPT | Performed by: NURSE PRACTITIONER

## 2025-06-26 PROCEDURE — 3077F SYST BP >= 140 MM HG: CPT | Performed by: NURSE PRACTITIONER

## 2025-06-26 PROCEDURE — 1159F MED LIST DOCD IN RCRD: CPT | Performed by: NURSE PRACTITIONER

## 2025-06-26 PROCEDURE — 1160F RVW MEDS BY RX/DR IN RCRD: CPT | Performed by: NURSE PRACTITIONER

## 2025-06-26 RX ORDER — CIPROFLOXACIN 500 MG/1
500 TABLET, FILM COATED ORAL 2 TIMES DAILY
Qty: 14 TABLET | Refills: 0 | Status: SHIPPED | OUTPATIENT
Start: 2025-06-26 | End: 2025-07-03

## 2025-06-26 RX ORDER — METHYLPREDNISOLONE 4 MG/1
TABLET ORAL
Qty: 21 TABLET | Refills: 0 | Status: SHIPPED | OUTPATIENT
Start: 2025-06-26

## 2025-06-26 RX ORDER — METRONIDAZOLE 500 MG/1
500 TABLET ORAL 2 TIMES DAILY
Qty: 14 TABLET | Refills: 0 | Status: SHIPPED | OUTPATIENT
Start: 2025-06-26 | End: 2025-07-03

## 2025-06-26 NOTE — PROGRESS NOTES
"Chief Complaint  Diarrhea (Patient states that she started having diarrhea on 06/18/2025. Patient states the BM was yellow in the beginning with explosive diarrhea and has taken Pepto Bismol yesterday.) and Cough (Patient is coughing up white foam.)    Subjective        Mally De La Cruz is a 75 y.o. female who presents today for evaluation of the above problems.    History of Present Illness  Presents for evaluation of the above complaints.  Returned from [place] to [place] on [place] 17 and got back to her home on June 18.  The diarrhea started on June 18.  It has been on and off since that time.  Yesterday she had 5 bowel movements just before lunch.  She took Pepto-Bismol and has only had a few bowel movements.  Says she is not having any abdominal pain.  She says when she was in [place] she was very particular about water that she was using and did not use any water there even to brush her teeth.  She did however eat or osteoblasts 1 night while she was there.  She reports no nausea or vomiting, no fevers or bodyaches.  No other people she was with has the symptoms.    She started having a cough the day before she left on June 16 with congestion, nasal drainage, and watery eyes.  The symptoms have persisted.  Again she denies any fever or other constitutional symptoms.  Nonproductive cough.  No shortness of breath, wheezing, or chest pain noted.      Review of Systems - Negative except as noted per HPI  History obtained from the patient    Objective   Vital Signs:  /80 (BP Location: Left arm, Patient Position: Sitting, Cuff Size: Adult)   Pulse 74   Temp 98.2 °F (36.8 °C) (Infrared)   Resp 18   Ht 152.4 cm (60\")   Wt 72.6 kg (160 lb)   SpO2 97%   BMI 31.25 kg/m²   Estimated body mass index is 31.25 kg/m² as calculated from the following:    Height as of this encounter: 152.4 cm (60\").    Weight as of this encounter: 72.6 kg (160 lb).           Physical Exam  Vitals reviewed.   Constitutional:       " General: She is not in acute distress.     Appearance: Normal appearance. She is not ill-appearing.   HENT:      Right Ear: Tympanic membrane, ear canal and external ear normal.      Left Ear: Tympanic membrane, ear canal and external ear normal.      Nose: Nose normal.      Mouth/Throat:      Mouth: Mucous membranes are moist.   Neck:      Thyroid: No thyroid mass, thyromegaly or thyroid tenderness.      Trachea: Trachea and phonation normal.   Cardiovascular:      Rate and Rhythm: Normal rate and regular rhythm.      Pulses: Normal pulses.      Heart sounds: Normal heart sounds. No murmur heard.     No friction rub. No gallop.   Pulmonary:      Effort: Pulmonary effort is normal. No respiratory distress.      Breath sounds: Normal breath sounds. No wheezing.   Abdominal:      General: Abdomen is flat. Bowel sounds are normal. There is no distension.      Tenderness: There is no abdominal tenderness. There is no guarding.   Musculoskeletal:      Cervical back: Normal range of motion and neck supple.   Lymphadenopathy:      Cervical: No cervical adenopathy.   Skin:     General: Skin is warm and dry.      Capillary Refill: Capillary refill takes less than 2 seconds.   Neurological:      General: No focal deficit present.      Mental Status: She is alert and oriented to person, place, and time.   Psychiatric:         Mood and Affect: Mood normal.         Behavior: Behavior normal.         Thought Content: Thought content normal.         Judgment: Judgment normal.          Result Review :                 Assessment and Plan   Diagnoses and all orders for this visit:    1. Diarrhea of presumed infectious origin (Primary)  -     metroNIDAZOLE (FLAGYL) 500 MG tablet; Take 1 tablet by mouth 2 (Two) Times a Day for 7 days.  Dispense: 14 tablet; Refill: 0  -     ciprofloxacin (Cipro) 500 MG tablet; Take 1 tablet by mouth 2 (Two) Times a Day for 7 days.  Dispense: 14 tablet; Refill: 0  -     Gastrointestinal Panel, PCR -  Stool, Per Rectum; Future  Will treat with a course of Flagyl and Cipro pending GI studies.  Push fluids.  No alcohol while taking Flagyl.  For any worsening symptoms or signs of dehydration seek emergency care.  Will obtain GI panel due to recent foreign travel as well as eating raw fish.    -     Gastrointestinal Panel, PCR - Stool, Per Rectum; Future    2. History of recent foreign travel  -     Gastrointestinal Panel, PCR - Stool, Per Rectum; Future    3. Upper respiratory tract infection, unspecified type  -     methylPREDNISolone (MEDROL) 4 MG dose pack; Take as directed on package instructions.  Dispense: 21 tablet; Refill: 0  Over-the-counter antihistamines as well.    4. Acute cough  -     POCT SARS-CoV-2 Antigen SERGIO + Flu  Tested for COVID due to foreign travel.  Negative     I spent 30 minutes caring for Mally on this date of service. This time includes time spent by me in the following activities:preparing for the visit, reviewing tests, obtaining and/or reviewing a separately obtained history, performing a medically appropriate examination and/or evaluation , counseling and educating the patient/family/caregiver, ordering medications, tests, or procedures, documenting information in the medical record, independently interpreting results and communicating that information with the patient/family/caregiver, and care coordination  Follow Up   Return if symptoms worsen or fail to improve.  Patient was given instructions and counseling regarding her condition or for health maintenance advice. Please see specific information pulled into the AVS if appropriate.

## 2025-06-27 ENCOUNTER — LAB (OUTPATIENT)
Dept: LAB | Facility: HOSPITAL | Age: 75
End: 2025-06-27
Payer: MEDICARE

## 2025-06-27 DIAGNOSIS — E78.2 MIXED HYPERLIPIDEMIA: ICD-10-CM

## 2025-06-27 DIAGNOSIS — Z78.9 HISTORY OF RECENT FOREIGN TRAVEL: ICD-10-CM

## 2025-06-27 DIAGNOSIS — R73.03 PREDIABETES: ICD-10-CM

## 2025-06-27 DIAGNOSIS — R19.7 DIARRHEA OF PRESUMED INFECTIOUS ORIGIN: ICD-10-CM

## 2025-06-27 DIAGNOSIS — I10 ESSENTIAL HYPERTENSION: ICD-10-CM

## 2025-06-27 DIAGNOSIS — D64.89 ANEMIA DUE TO OTHER CAUSE, NOT CLASSIFIED: ICD-10-CM

## 2025-06-27 PROCEDURE — 87507 IADNA-DNA/RNA PROBE TQ 12-25: CPT

## 2025-06-27 PROCEDURE — 36415 COLL VENOUS BLD VENIPUNCTURE: CPT

## 2025-06-28 LAB
ADV 40+41 DNA STL QL NAA+NON-PROBE: NOT DETECTED
ASTRO TYP 1-8 RNA STL QL NAA+NON-PROBE: NOT DETECTED
C CAYETANENSIS DNA STL QL NAA+NON-PROBE: NOT DETECTED
C COLI+JEJ+UPSA DNA STL QL NAA+NON-PROBE: NOT DETECTED
C DIF TOX TCDA+TCDB STL QL NAA+NON-PROBE: NOT DETECTED
CRYPTOSP DNA STL QL NAA+NON-PROBE: NOT DETECTED
E COLI O157 DNA STL QL NAA+NON-PROBE: ABNORMAL
E HISTOLYT DNA STL QL NAA+NON-PROBE: NOT DETECTED
EAEC PAA PLAS AGGR+AATA ST NAA+NON-PRB: DETECTED
EC STX1+STX2 GENES STL QL NAA+NON-PROBE: NOT DETECTED
EPEC EAE GENE STL QL NAA+NON-PROBE: DETECTED
ETEC LTA+ST1A+ST1B TOX ST NAA+NON-PROBE: DETECTED
G LAMBLIA DNA STL QL NAA+NON-PROBE: NOT DETECTED
NOROVIRUS GI+II RNA STL QL NAA+NON-PROBE: NOT DETECTED
P SHIGELLOIDES DNA STL QL NAA+NON-PROBE: NOT DETECTED
RVA RNA STL QL NAA+NON-PROBE: NOT DETECTED
S ENT+BONG DNA STL QL NAA+NON-PROBE: NOT DETECTED
SAPO I+II+IV+V RNA STL QL NAA+NON-PROBE: NOT DETECTED
SHIGELLA SP+EIEC IPAH ST NAA+NON-PROBE: NOT DETECTED
V CHOL+PARA+VUL DNA STL QL NAA+NON-PROBE: NOT DETECTED
V CHOLERAE DNA STL QL NAA+NON-PROBE: NOT DETECTED
Y ENTEROCOL DNA STL QL NAA+NON-PROBE: NOT DETECTED

## 2025-08-07 DIAGNOSIS — M25.511 RIGHT SHOULDER PAIN, UNSPECIFIED CHRONICITY: Primary | ICD-10-CM

## 2025-08-13 ENCOUNTER — HOSPITAL ENCOUNTER (OUTPATIENT)
Dept: PHYSICAL THERAPY | Facility: HOSPITAL | Age: 75
Setting detail: THERAPIES SERIES
Discharge: HOME | End: 2025-08-13
Payer: MEDICARE

## 2025-08-13 DIAGNOSIS — M25.511 RIGHT SHOULDER PAIN, UNSPECIFIED CHRONICITY: Primary | ICD-10-CM

## 2025-08-13 PROCEDURE — 97535 SELF CARE MNGMENT TRAINING: CPT

## 2025-08-13 PROCEDURE — 97162 PT EVAL MOD COMPLEX 30 MIN: CPT

## 2025-08-13 PROCEDURE — 97140 MANUAL THERAPY 1/> REGIONS: CPT

## 2025-08-15 ENCOUNTER — HOSPITAL ENCOUNTER (OUTPATIENT)
Dept: CARDIOLOGY | Facility: HOSPITAL | Age: 75
Discharge: HOME | End: 2025-08-15
Payer: MEDICARE

## 2025-08-15 ENCOUNTER — HOSPITAL ENCOUNTER (OUTPATIENT)
Dept: ULTRASOUND IMAGING | Facility: HOSPITAL | Age: 75
Discharge: HOME | End: 2025-08-15
Payer: MEDICARE

## 2025-08-15 VITALS
DIASTOLIC BLOOD PRESSURE: 80 MMHG | BODY MASS INDEX: 31.41 KG/M2 | WEIGHT: 160 LBS | HEIGHT: 60 IN | SYSTOLIC BLOOD PRESSURE: 144 MMHG

## 2025-08-15 DIAGNOSIS — R09.89 BILATERAL CAROTID BRUITS: ICD-10-CM

## 2025-08-15 DIAGNOSIS — R01.1 SYSTOLIC MURMUR: ICD-10-CM

## 2025-08-15 PROCEDURE — 93356 MYOCRD STRAIN IMG SPCKL TRCK: CPT

## 2025-08-15 PROCEDURE — 93306 TTE W/DOPPLER COMPLETE: CPT

## 2025-08-15 PROCEDURE — 93880 EXTRACRANIAL BILAT STUDY: CPT

## 2025-08-18 ENCOUNTER — HOSPITAL ENCOUNTER (OUTPATIENT)
Dept: PHYSICAL THERAPY | Facility: HOSPITAL | Age: 75
Setting detail: THERAPIES SERIES
Discharge: HOME | End: 2025-08-18
Payer: MEDICARE

## 2025-08-18 DIAGNOSIS — M25.511 RIGHT SHOULDER PAIN, UNSPECIFIED CHRONICITY: Primary | ICD-10-CM

## 2025-08-18 LAB
AORTIC ARCH: 2.8 CM
AORTIC DIMENSIONLESS INDEX: 0.94 (DI)
ASCENDING AORTA: 3.6 CM
AV MEAN PRESS GRAD SYS DOP V1V2: 2.01 MMHG
AV VMAX SYS DOP: 100.8 CM/SEC
BH CV ECHO LEFT VENTRICLE GLOBAL LONGITUDINAL STRAIN: -17.8 %
BH CV ECHO MEAS - 2D AUTO EF SIEMENS: 56.6 %
BH CV ECHO MEAS - AO MAX PG: 4.1 MMHG
BH CV ECHO MEAS - AO ROOT DIAM: 3.5 CM
BH CV ECHO MEAS - AO V2 VTI: 21.3 CM
BH CV ECHO MEAS - AVA(I,D): 3.9 CM2
BH CV ECHO MEAS - EDV(CUBED): 77.8 ML
BH CV ECHO MEAS - EDV(MOD-SP2): 51.7 ML
BH CV ECHO MEAS - EDV(MOD-SP4): 62.2 ML
BH CV ECHO MEAS - EF(MOD-SP2): 59.4 %
BH CV ECHO MEAS - EF(MOD-SP4): 70 %
BH CV ECHO MEAS - ESV(CUBED): 22.2 ML
BH CV ECHO MEAS - ESV(MOD-SP2): 21 ML
BH CV ECHO MEAS - ESV(MOD-SP4): 18.6 ML
BH CV ECHO MEAS - FS: 34.2 %
BH CV ECHO MEAS - IVS/LVPW: 1.29 CM
BH CV ECHO MEAS - IVSD: 1.07 CM
BH CV ECHO MEAS - LA DIMENSION: 3.9 CM
BH CV ECHO MEAS - LAT PEAK E' VEL: 7.2 CM/SEC
BH CV ECHO MEAS - LV DIASTOLIC VOL/BSA (35-75): 36.7 CM2
BH CV ECHO MEAS - LV MASS(C)D: 131.5 GRAMS
BH CV ECHO MEAS - LV MAX PG: 3.2 MMHG
BH CV ECHO MEAS - LV MEAN PG: 1.83 MMHG
BH CV ECHO MEAS - LV SYSTOLIC VOL/BSA (12-30): 11 CM2
BH CV ECHO MEAS - LV V1 MAX: 88.9 CM/SEC
BH CV ECHO MEAS - LV V1 VTI: 20 CM
BH CV ECHO MEAS - LVIDD: 4.3 CM
BH CV ECHO MEAS - LVIDS: 2.8 CM
BH CV ECHO MEAS - LVOT AREA: 4.2 CM2
BH CV ECHO MEAS - LVOT DIAM: 2.31 CM
BH CV ECHO MEAS - LVPWD: 0.83 CM
BH CV ECHO MEAS - MED PEAK E' VEL: 5.1 CM/SEC
BH CV ECHO MEAS - MV A MAX VEL: 81.8 CM/SEC
BH CV ECHO MEAS - MV DEC SLOPE: 236.3 CM/SEC2
BH CV ECHO MEAS - MV DEC TIME: 0.23 SEC
BH CV ECHO MEAS - MV E MAX VEL: 54 CM/SEC
BH CV ECHO MEAS - MV E/A: 0.66
BH CV ECHO MEAS - MV MAX PG: 2.9 MMHG
BH CV ECHO MEAS - MV MEAN PG: 1.07 MMHG
BH CV ECHO MEAS - MV V2 VTI: 27.7 CM
BH CV ECHO MEAS - MVA(VTI): 3 CM2
BH CV ECHO MEAS - PA V2 MAX: 131.1 CM/SEC
BH CV ECHO MEAS - PI END-D VEL: 115.5 CM/SEC
BH CV ECHO MEAS - PULM A REVS DUR: 0.13 SEC
BH CV ECHO MEAS - PULM A REVS VEL: 35.5 CM/SEC
BH CV ECHO MEAS - RAP SYSTOLE: 3 MMHG
BH CV ECHO MEAS - RV MAX PG: 3.4 MMHG
BH CV ECHO MEAS - RV V1 MAX: 92.8 CM/SEC
BH CV ECHO MEAS - RV V1 VTI: 16.2 CM
BH CV ECHO MEAS - RVDD: 4.1 CM
BH CV ECHO MEAS - RVSP: 28.6 MMHG
BH CV ECHO MEAS - SV(LVOT): 83.9 ML
BH CV ECHO MEAS - SV(MOD-SP2): 30.7 ML
BH CV ECHO MEAS - SV(MOD-SP4): 43.6 ML
BH CV ECHO MEAS - SVI(LVOT): 49.4 ML/M2
BH CV ECHO MEAS - SVI(MOD-SP2): 18.1 ML/M2
BH CV ECHO MEAS - SVI(MOD-SP4): 25.7 ML/M2
BH CV ECHO MEAS - TAPSE (>1.6): 1.38 CM
BH CV ECHO MEAS - TR MAX PG: 25.6 MMHG
BH CV ECHO MEAS - TR MAX VEL: 253.1 CM/SEC
BH CV ECHO MEASUREMENTS AVERAGE E/E' RATIO: 8.78
BH CV XLRA - RV BASE: 3.3 CM
BH CV XLRA - RV LENGTH: 6.7 CM
BH CV XLRA - RV MID: 2.5 CM
BH CV XLRA - TDI S': 17 CM/SEC
LEFT ATRIUM VOLUME INDEX: 25 ML/M2
LEFT ATRIUM VOLUME: 40 ML
LV EF BIPLANE MOD: 64 %

## 2025-08-18 PROCEDURE — 97110 THERAPEUTIC EXERCISES: CPT

## 2025-08-18 PROCEDURE — 97140 MANUAL THERAPY 1/> REGIONS: CPT

## 2025-08-20 ENCOUNTER — HOSPITAL ENCOUNTER (OUTPATIENT)
Dept: PHYSICAL THERAPY | Facility: HOSPITAL | Age: 75
Setting detail: THERAPIES SERIES
Discharge: HOME | End: 2025-08-20
Payer: MEDICARE

## 2025-08-20 DIAGNOSIS — M25.511 RIGHT SHOULDER PAIN, UNSPECIFIED CHRONICITY: Primary | ICD-10-CM

## 2025-08-20 DIAGNOSIS — M53.82: ICD-10-CM

## 2025-08-20 PROCEDURE — 97110 THERAPEUTIC EXERCISES: CPT

## 2025-08-20 PROCEDURE — 97140 MANUAL THERAPY 1/> REGIONS: CPT

## 2025-08-22 ENCOUNTER — HOSPITAL ENCOUNTER (OUTPATIENT)
Dept: PHYSICAL THERAPY | Facility: HOSPITAL | Age: 75
Setting detail: THERAPIES SERIES
Discharge: HOME | End: 2025-08-22
Payer: MEDICARE

## 2025-08-22 DIAGNOSIS — M25.511 RIGHT SHOULDER PAIN, UNSPECIFIED CHRONICITY: Primary | ICD-10-CM

## 2025-08-22 PROCEDURE — 97140 MANUAL THERAPY 1/> REGIONS: CPT

## 2025-08-22 PROCEDURE — 97110 THERAPEUTIC EXERCISES: CPT

## 2025-08-25 ENCOUNTER — HOSPITAL ENCOUNTER (OUTPATIENT)
Dept: PHYSICAL THERAPY | Facility: HOSPITAL | Age: 75
Setting detail: THERAPIES SERIES
Discharge: HOME | End: 2025-08-25
Payer: MEDICARE

## 2025-08-25 DIAGNOSIS — M25.511 RIGHT SHOULDER PAIN, UNSPECIFIED CHRONICITY: Primary | ICD-10-CM

## 2025-08-25 PROCEDURE — 97110 THERAPEUTIC EXERCISES: CPT

## 2025-08-25 PROCEDURE — 97140 MANUAL THERAPY 1/> REGIONS: CPT

## 2025-08-27 ENCOUNTER — HOSPITAL ENCOUNTER (OUTPATIENT)
Dept: PHYSICAL THERAPY | Facility: HOSPITAL | Age: 75
Setting detail: THERAPIES SERIES
Discharge: HOME | End: 2025-08-27
Payer: MEDICARE

## 2025-08-27 DIAGNOSIS — M25.511 RIGHT SHOULDER PAIN, UNSPECIFIED CHRONICITY: Primary | ICD-10-CM

## 2025-08-27 PROCEDURE — 97140 MANUAL THERAPY 1/> REGIONS: CPT

## 2025-08-27 PROCEDURE — 97110 THERAPEUTIC EXERCISES: CPT

## 2025-08-28 ENCOUNTER — OFFICE VISIT (OUTPATIENT)
Age: 75
End: 2025-08-28
Payer: MEDICARE

## 2025-08-28 VITALS
HEIGHT: 60 IN | SYSTOLIC BLOOD PRESSURE: 112 MMHG | DIASTOLIC BLOOD PRESSURE: 72 MMHG | WEIGHT: 156.4 LBS | BODY MASS INDEX: 30.7 KG/M2

## 2025-08-28 DIAGNOSIS — Z01.419 WELL WOMAN EXAM WITH ROUTINE GYNECOLOGICAL EXAM: Primary | ICD-10-CM

## 2025-08-28 DIAGNOSIS — A60.00 GENITAL HERPES SIMPLEX, UNSPECIFIED SITE: ICD-10-CM

## 2025-08-28 DIAGNOSIS — N81.6 CYSTOCELE WITH RECTOCELE: ICD-10-CM

## 2025-08-28 DIAGNOSIS — Z98.890 HISTORY OF CRYOSURGERY: ICD-10-CM

## 2025-08-28 DIAGNOSIS — N81.10 CYSTOCELE WITH RECTOCELE: ICD-10-CM

## 2025-08-28 DIAGNOSIS — Z12.31 BREAST CANCER SCREENING BY MAMMOGRAM: ICD-10-CM

## 2025-08-29 ENCOUNTER — HOSPITAL ENCOUNTER (OUTPATIENT)
Dept: PHYSICAL THERAPY | Facility: HOSPITAL | Age: 75
Setting detail: THERAPIES SERIES
Discharge: HOME | End: 2025-08-29
Payer: MEDICARE

## 2025-08-29 DIAGNOSIS — M25.511 RIGHT SHOULDER PAIN, UNSPECIFIED CHRONICITY: Primary | ICD-10-CM

## 2025-08-29 PROCEDURE — 97140 MANUAL THERAPY 1/> REGIONS: CPT

## 2025-08-29 PROCEDURE — 97110 THERAPEUTIC EXERCISES: CPT

## (undated) DEVICE — THE CHANNEL CLEANING BRUSH IS A NYLON FLEXI BRUSH ATTACHED TO A FLEXIBLE PLASTIC SHEATH DESIGNED TO SAFELY REMOVE DEBRIS FROM FLEXIBLE ENDOSCOPES.

## (undated) DEVICE — THE SINGLE USE ETRAP – POLYP TRAP IS USED FOR SUCTION RETRIEVAL OF ENDOSCOPICALLY REMOVED POLYPS.: Brand: ETRAP

## (undated) DEVICE — Device: Brand: DEFENDO AIR/WATER/SUCTION AND BIOPSY VALVE

## (undated) DEVICE — KT VLV BIOP DEFENDO SXN AIR/WATER

## (undated) DEVICE — TRAP POLYP ETRAP 2PK

## (undated) DEVICE — SNAR POLYP SENSATION MICRO OVL 13 240X40

## (undated) DEVICE — TP SXN YANKR W/VENT STRL

## (undated) DEVICE — MSK O2 CONCENTR/MD CONN/UNIV A/ 7FT DISP

## (undated) DEVICE — CUFF BP 1TB CONN/BAYO A/

## (undated) DEVICE — CUFF,BP,DISP,1 TUBE,ADULT,HP: Brand: MEDLINE

## (undated) DEVICE — MASK,OXYGEN,MED CONC,ADLT,7' TUB, UC: Brand: PENDING

## (undated) DEVICE — TBG SMPL FLTR LINE NASL 02/C02 A/ BX/100

## (undated) DEVICE — BRSH CLN CH 1.7MM 230CM 5TO7MM

## (undated) DEVICE — YANKAUER,BULB TIP WITH VENT: Brand: ARGYLE

## (undated) DEVICE — ENDOGATOR AUXILIARY WATER JET CONNECTOR: Brand: ENDOGATOR

## (undated) DEVICE — SENSR O2 OXIMAX FNGR A/ 18IN NONSTR

## (undated) DEVICE — SNAR POLYP CAPTIVATOR RND STFF 2.4 240CM 10MM 1P/U